# Patient Record
Sex: MALE | Race: WHITE | NOT HISPANIC OR LATINO | Employment: PART TIME | URBAN - METROPOLITAN AREA
[De-identification: names, ages, dates, MRNs, and addresses within clinical notes are randomized per-mention and may not be internally consistent; named-entity substitution may affect disease eponyms.]

---

## 2023-03-31 DIAGNOSIS — M10.9 GOUT, UNSPECIFIED CAUSE, UNSPECIFIED CHRONICITY, UNSPECIFIED SITE: Primary | ICD-10-CM

## 2023-04-03 RX ORDER — ALLOPURINOL 300 MG/1
TABLET ORAL
Qty: 90 TABLET | Refills: 0 | Status: SHIPPED | OUTPATIENT
Start: 2023-04-03 | End: 2023-07-03

## 2023-05-26 LAB
ALANINE AMINOTRANSFERASE (SGPT) (U/L) IN SER/PLAS: 35 U/L (ref 10–52)
ALBUMIN (G/DL) IN SER/PLAS: 4.3 G/DL (ref 3.4–5)
ALKALINE PHOSPHATASE (U/L) IN SER/PLAS: 56 U/L (ref 33–136)
ANION GAP IN SER/PLAS: 13 MMOL/L (ref 10–20)
ASPARTATE AMINOTRANSFERASE (SGOT) (U/L) IN SER/PLAS: 21 U/L (ref 9–39)
BASOPHILS (10*3/UL) IN BLOOD BY AUTOMATED COUNT: 0.05 X10E9/L (ref 0–0.1)
BASOPHILS/100 LEUKOCYTES IN BLOOD BY AUTOMATED COUNT: 0.5 % (ref 0–2)
BILIRUBIN TOTAL (MG/DL) IN SER/PLAS: 0.7 MG/DL (ref 0–1.2)
CALCIUM (MG/DL) IN SER/PLAS: 10 MG/DL (ref 8.6–10.3)
CARBON DIOXIDE, TOTAL (MMOL/L) IN SER/PLAS: 30 MMOL/L (ref 21–32)
CHLORIDE (MMOL/L) IN SER/PLAS: 104 MMOL/L (ref 98–107)
CHOLESTEROL (MG/DL) IN SER/PLAS: 137 MG/DL (ref 0–199)
CHOLESTEROL IN HDL (MG/DL) IN SER/PLAS: 39.1 MG/DL
CHOLESTEROL/HDL RATIO: 3.5
CREATININE (MG/DL) IN SER/PLAS: 1.1 MG/DL (ref 0.5–1.3)
EOSINOPHILS (10*3/UL) IN BLOOD BY AUTOMATED COUNT: 0.54 X10E9/L (ref 0–0.4)
EOSINOPHILS/100 LEUKOCYTES IN BLOOD BY AUTOMATED COUNT: 5.1 % (ref 0–6)
ERYTHROCYTE DISTRIBUTION WIDTH (RATIO) BY AUTOMATED COUNT: 13.1 % (ref 11.5–14.5)
ERYTHROCYTE MEAN CORPUSCULAR HEMOGLOBIN CONCENTRATION (G/DL) BY AUTOMATED: 32.1 G/DL (ref 32–36)
ERYTHROCYTE MEAN CORPUSCULAR VOLUME (FL) BY AUTOMATED COUNT: 98 FL (ref 80–100)
ERYTHROCYTES (10*6/UL) IN BLOOD BY AUTOMATED COUNT: 4.88 X10E12/L (ref 4.5–5.9)
ESTIMATED AVERAGE GLUCOSE FOR HBA1C: 114 MG/DL
GFR MALE: 68 ML/MIN/1.73M2
GLUCOSE (MG/DL) IN SER/PLAS: 93 MG/DL (ref 74–99)
HEMATOCRIT (%) IN BLOOD BY AUTOMATED COUNT: 47.7 % (ref 41–52)
HEMOGLOBIN (G/DL) IN BLOOD: 15.3 G/DL (ref 13.5–17.5)
HEMOGLOBIN A1C/HEMOGLOBIN TOTAL IN BLOOD: 5.6 %
IMMATURE GRANULOCYTES/100 LEUKOCYTES IN BLOOD BY AUTOMATED COUNT: 0.2 % (ref 0–0.9)
LDL: 71 MG/DL (ref 0–99)
LEUKOCYTES (10*3/UL) IN BLOOD BY AUTOMATED COUNT: 10.7 X10E9/L (ref 4.4–11.3)
LYMPHOCYTES (10*3/UL) IN BLOOD BY AUTOMATED COUNT: 3.65 X10E9/L (ref 0.8–3)
LYMPHOCYTES/100 LEUKOCYTES IN BLOOD BY AUTOMATED COUNT: 34.2 % (ref 13–44)
MONOCYTES (10*3/UL) IN BLOOD BY AUTOMATED COUNT: 0.74 X10E9/L (ref 0.05–0.8)
MONOCYTES/100 LEUKOCYTES IN BLOOD BY AUTOMATED COUNT: 6.9 % (ref 2–10)
NEUTROPHILS (10*3/UL) IN BLOOD BY AUTOMATED COUNT: 5.67 X10E9/L (ref 1.6–5.5)
NEUTROPHILS/100 LEUKOCYTES IN BLOOD BY AUTOMATED COUNT: 53.1 % (ref 40–80)
PLATELETS (10*3/UL) IN BLOOD AUTOMATED COUNT: 243 X10E9/L (ref 150–450)
POTASSIUM (MMOL/L) IN SER/PLAS: 4.8 MMOL/L (ref 3.5–5.3)
PROSTATE SPECIFIC ANTIGEN,SCREEN: 2.33 NG/ML (ref 0–4)
PROTEIN TOTAL: 7.1 G/DL (ref 6.4–8.2)
SODIUM (MMOL/L) IN SER/PLAS: 142 MMOL/L (ref 136–145)
THYROTROPIN (MIU/L) IN SER/PLAS BY DETECTION LIMIT <= 0.05 MIU/L: 1.95 MIU/L (ref 0.44–3.98)
TRIGLYCERIDE (MG/DL) IN SER/PLAS: 136 MG/DL (ref 0–149)
UREA NITROGEN (MG/DL) IN SER/PLAS: 21 MG/DL (ref 6–23)
VLDL: 27 MG/DL (ref 0–40)

## 2023-06-20 ENCOUNTER — OFFICE VISIT (OUTPATIENT)
Dept: PRIMARY CARE | Facility: CLINIC | Age: 78
End: 2023-06-20
Payer: MEDICARE

## 2023-06-20 VITALS
DIASTOLIC BLOOD PRESSURE: 64 MMHG | BODY MASS INDEX: 32.11 KG/M2 | OXYGEN SATURATION: 93 % | HEIGHT: 71 IN | WEIGHT: 229.4 LBS | RESPIRATION RATE: 18 BRPM | HEART RATE: 90 BPM | SYSTOLIC BLOOD PRESSURE: 120 MMHG

## 2023-06-20 DIAGNOSIS — G95.9 CERVICAL MYELOPATHY (MULTI): ICD-10-CM

## 2023-06-20 DIAGNOSIS — M54.16 LUMBAR RADICULOPATHY: ICD-10-CM

## 2023-06-20 DIAGNOSIS — I73.9 PVD (PERIPHERAL VASCULAR DISEASE) (CMS-HCC): ICD-10-CM

## 2023-06-20 DIAGNOSIS — I10 HYPERTENSION, UNSPECIFIED TYPE: Primary | ICD-10-CM

## 2023-06-20 DIAGNOSIS — E78.5 HYPERLIPIDEMIA, UNSPECIFIED HYPERLIPIDEMIA TYPE: ICD-10-CM

## 2023-06-20 PROBLEM — I47.10 SUPRAVENTRICULAR TACHYCARDIA (CMS-HCC): Status: ACTIVE | Noted: 2023-06-20

## 2023-06-20 PROBLEM — N52.9 ERECTILE DYSFUNCTION: Status: ACTIVE | Noted: 2023-06-20

## 2023-06-20 PROBLEM — K21.9 GERD (GASTROESOPHAGEAL REFLUX DISEASE): Status: ACTIVE | Noted: 2023-06-20

## 2023-06-20 PROBLEM — M10.9 GOUT: Status: ACTIVE | Noted: 2023-06-20

## 2023-06-20 PROBLEM — I47.10 SUPRAVENTRICULAR TACHYCARDIA (CMS-HCC): Status: RESOLVED | Noted: 2023-06-20 | Resolved: 2023-06-20

## 2023-06-20 PROBLEM — R41.3 MEMORY LOSS: Status: ACTIVE | Noted: 2023-06-20

## 2023-06-20 PROBLEM — G47.30 SLEEP APNEA: Status: ACTIVE | Noted: 2023-06-20

## 2023-06-20 PROBLEM — R26.81 UNSTABLE GAIT: Status: ACTIVE | Noted: 2023-06-20

## 2023-06-20 PROBLEM — R79.89 ELEVATED LFTS: Status: ACTIVE | Noted: 2023-06-20

## 2023-06-20 PROBLEM — M48.061 LUMBAR STENOSIS: Status: ACTIVE | Noted: 2023-06-20

## 2023-06-20 PROBLEM — R79.89 ELEVATED LFTS: Status: RESOLVED | Noted: 2023-06-20 | Resolved: 2023-06-20

## 2023-06-20 PROBLEM — L42 PITYRIASIS ROSEA: Status: ACTIVE | Noted: 2023-06-20

## 2023-06-20 PROCEDURE — 1160F RVW MEDS BY RX/DR IN RCRD: CPT | Performed by: STUDENT IN AN ORGANIZED HEALTH CARE EDUCATION/TRAINING PROGRAM

## 2023-06-20 PROCEDURE — 3074F SYST BP LT 130 MM HG: CPT | Performed by: STUDENT IN AN ORGANIZED HEALTH CARE EDUCATION/TRAINING PROGRAM

## 2023-06-20 PROCEDURE — 3078F DIAST BP <80 MM HG: CPT | Performed by: STUDENT IN AN ORGANIZED HEALTH CARE EDUCATION/TRAINING PROGRAM

## 2023-06-20 PROCEDURE — 99214 OFFICE O/P EST MOD 30 MIN: CPT | Performed by: STUDENT IN AN ORGANIZED HEALTH CARE EDUCATION/TRAINING PROGRAM

## 2023-06-20 PROCEDURE — 1159F MED LIST DOCD IN RCRD: CPT | Performed by: STUDENT IN AN ORGANIZED HEALTH CARE EDUCATION/TRAINING PROGRAM

## 2023-06-20 PROCEDURE — 1036F TOBACCO NON-USER: CPT | Performed by: STUDENT IN AN ORGANIZED HEALTH CARE EDUCATION/TRAINING PROGRAM

## 2023-06-20 RX ORDER — SILDENAFIL 50 MG/1
TABLET, FILM COATED ORAL
COMMUNITY

## 2023-06-20 RX ORDER — CEPHRADINE 500 MG
1 CAPSULE ORAL DAILY
COMMUNITY
Start: 2016-01-28 | End: 2023-12-07 | Stop reason: WASHOUT

## 2023-06-20 RX ORDER — LISINOPRIL 20 MG/1
20 TABLET ORAL DAILY
COMMUNITY
End: 2024-01-15

## 2023-06-20 RX ORDER — MELOXICAM 15 MG/1
15 TABLET ORAL DAILY PRN
COMMUNITY
End: 2023-11-09

## 2023-06-20 RX ORDER — FLUOCINOLONE ACETONIDE 0.11 MG/ML
OIL AURICULAR (OTIC)
COMMUNITY
Start: 2023-05-24 | End: 2023-12-07 | Stop reason: WASHOUT

## 2023-06-20 RX ORDER — ATORVASTATIN CALCIUM 40 MG/1
40 TABLET, FILM COATED ORAL DAILY
COMMUNITY

## 2023-06-20 RX ORDER — TRIAMCINOLONE ACETONIDE 1 MG/G
OINTMENT TOPICAL
COMMUNITY
Start: 2023-04-27

## 2023-06-20 RX ORDER — EPINEPHRINE 0.22MG
AEROSOL WITH ADAPTER (ML) INHALATION
COMMUNITY
Start: 2016-01-28

## 2023-06-20 ASSESSMENT — ENCOUNTER SYMPTOMS
LOSS OF SENSATION IN FEET: 1
DEPRESSION: 0
OCCASIONAL FEELINGS OF UNSTEADINESS: 0

## 2023-06-20 ASSESSMENT — PATIENT HEALTH QUESTIONNAIRE - PHQ9
2. FEELING DOWN, DEPRESSED OR HOPELESS: NOT AT ALL
SUM OF ALL RESPONSES TO PHQ9 QUESTIONS 1 AND 2: 0
1. LITTLE INTEREST OR PLEASURE IN DOING THINGS: NOT AT ALL

## 2023-06-20 NOTE — ASSESSMENT & PLAN NOTE
Doing very well  Continue lisinopril 20mg daily  Physical exam was stable. Discussed maintaining diets such as DASH to help maintain normal Blood pressure. Encouraged regular exercise for a total of 120 min weekly. We will fu labs in 1-3 days. For now there will be no change in medical management. All questions and concerns were addressed. Pt will follow up in 4-6 months or sooner if needed.

## 2023-06-20 NOTE — PROGRESS NOTES
Subjective   Patient ID: Cl Feliz is a 77 y.o. male who presents for Follow-up (Pt is here for a 6 month follow up.).    Sensation of the lower extremity is continuing to have a problem. He is noticing decrease in tolerance to walking. He has completed a stress test with cardiology.           Review of Systems    Objective   Physical Exam  Constitutional:       General: He is not in acute distress.     Appearance: He is not ill-appearing.   HENT:      Right Ear: Tympanic membrane and ear canal normal.      Left Ear: Tympanic membrane and ear canal normal.      Mouth/Throat:      Mouth: Mucous membranes are moist.      Pharynx: Oropharynx is clear. No oropharyngeal exudate or posterior oropharyngeal erythema.   Eyes:      Extraocular Movements: Extraocular movements intact.      Conjunctiva/sclera: Conjunctivae normal.      Pupils: Pupils are equal, round, and reactive to light.   Neck:      Vascular: No carotid bruit.   Cardiovascular:      Rate and Rhythm: Normal rate and regular rhythm.      Heart sounds: No murmur heard.     No gallop.   Pulmonary:      Effort: Pulmonary effort is normal.      Breath sounds: Normal breath sounds. No wheezing, rhonchi or rales.   Abdominal:      General: Abdomen is flat. Bowel sounds are normal.      Palpations: Abdomen is soft.      Tenderness: There is no abdominal tenderness.   Musculoskeletal:      Cervical back: Neck supple.      Left lower leg: No edema.   Skin:     General: Skin is warm and dry.      Findings: No rash.   Neurological:      General: No focal deficit present.      Mental Status: He is alert and oriented to person, place, and time.      Gait: Gait normal.   Psychiatric:         Mood and Affect: Mood normal.         Behavior: Behavior normal.         Assessment/Plan   Problem List Items Addressed This Visit       Lumbar stenosis    PVD (peripheral vascular disease) (CMS/[facility])     PvR completed with home visit   Within normal limited         Hyperlipidemia      Atorvastatin 40mg daily  Doing well         HTN (hypertension) - Primary     Doing very well  Continue lisinopril 20mg daily  Physical exam was stable. Discussed maintaining diets such as DASH to help maintain normal Blood pressure. Encouraged regular exercise for a total of 120 min weekly. We will fu labs in 1-3 days. For now there will be no change in medical management. All questions and concerns were addressed. Pt will follow up in 4-6 months or sooner if needed.             Other Visit Diagnoses       Lumbar radiculopathy        numbness in the lower extremity   will start with lumbar xray  PT next steps    Relevant Orders    XR lumbar spine complete 4+ views

## 2023-06-22 DIAGNOSIS — M54.16 LUMBAR RADICULOPATHY: Primary | ICD-10-CM

## 2023-07-01 DIAGNOSIS — M10.9 GOUT, UNSPECIFIED CAUSE, UNSPECIFIED CHRONICITY, UNSPECIFIED SITE: ICD-10-CM

## 2023-07-03 RX ORDER — ALLOPURINOL 300 MG/1
TABLET ORAL
Qty: 90 TABLET | Refills: 0 | Status: SHIPPED | OUTPATIENT
Start: 2023-07-03 | End: 2023-09-21

## 2023-09-21 DIAGNOSIS — M10.9 GOUT, UNSPECIFIED CAUSE, UNSPECIFIED CHRONICITY, UNSPECIFIED SITE: ICD-10-CM

## 2023-09-21 RX ORDER — ALLOPURINOL 300 MG/1
TABLET ORAL
Qty: 90 TABLET | Refills: 1 | Status: SHIPPED | OUTPATIENT
Start: 2023-09-21 | End: 2024-04-05 | Stop reason: SDUPTHER

## 2023-10-09 ENCOUNTER — APPOINTMENT (OUTPATIENT)
Dept: PHYSICAL THERAPY | Facility: CLINIC | Age: 78
End: 2023-10-09
Payer: MEDICARE

## 2023-10-10 PROBLEM — E79.0 HYPERURICEMIA WITHOUT SIGNS OF INFLAMMATORY ARTHRITIS AND TOPHACEOUS DISEASE: Status: ACTIVE | Noted: 2023-10-10

## 2023-10-10 PROBLEM — H60.549 ECZEMA OF EXTERNAL AUDITORY CANAL: Status: ACTIVE | Noted: 2021-05-13

## 2023-10-10 PROBLEM — M62.81 MUSCLE WEAKNESS: Status: ACTIVE | Noted: 2023-10-10

## 2023-10-10 PROBLEM — R55 NEAR SYNCOPE: Status: ACTIVE | Noted: 2023-10-10

## 2023-10-10 PROBLEM — M54.16 LUMBAR RADICULOPATHY: Status: ACTIVE | Noted: 2023-10-10

## 2023-10-10 PROBLEM — H61.23 BILATERAL IMPACTED CERUMEN: Status: ACTIVE | Noted: 2020-05-26

## 2023-10-10 PROBLEM — M1A.0510 CHRONIC GOUT OF RIGHT HIP: Status: ACTIVE | Noted: 2023-10-10

## 2023-10-10 PROBLEM — M48.02 CERVICAL SPINAL STENOSIS: Status: ACTIVE | Noted: 2023-10-10

## 2023-10-10 PROBLEM — H60.399 ACUTE INFECTIVE OTITIS EXTERNA: Status: ACTIVE | Noted: 2020-05-26

## 2023-10-10 PROBLEM — E66.811 CLASS 1 OBESITY WITH BODY MASS INDEX (BMI) OF 31.0 TO 31.9 IN ADULT: Status: ACTIVE | Noted: 2023-10-10

## 2023-10-10 PROBLEM — R26.9 GAIT DISTURBANCE: Status: ACTIVE | Noted: 2023-10-10

## 2023-10-10 PROBLEM — G47.33 OSA (OBSTRUCTIVE SLEEP APNEA): Status: ACTIVE | Noted: 2018-05-30

## 2023-10-10 PROBLEM — E66.9 CLASS 1 OBESITY WITH BODY MASS INDEX (BMI) OF 31.0 TO 31.9 IN ADULT: Status: ACTIVE | Noted: 2023-10-10

## 2023-10-10 PROBLEM — G95.9 CERVICAL MYELOPATHY (MULTI): Status: ACTIVE | Noted: 2023-10-10

## 2023-10-10 RX ORDER — LOVASTATIN 20 MG/1
1 TABLET ORAL NIGHTLY
COMMUNITY
Start: 2013-10-07 | End: 2023-12-07 | Stop reason: WASHOUT

## 2023-10-10 RX ORDER — SOTROVIMAB 62.5 MG/ML
INJECTION, SOLUTION, CONCENTRATE INTRAVENOUS
COMMUNITY
End: 2023-12-07 | Stop reason: WASHOUT

## 2023-10-10 RX ORDER — DEXAMETHASONE 6 MG/1
1 TABLET ORAL DAILY
COMMUNITY
End: 2023-12-07 | Stop reason: WASHOUT

## 2023-10-10 RX ORDER — LISINOPRIL 10 MG/1
10 TABLET ORAL DAILY
COMMUNITY
Start: 2013-10-07 | End: 2023-12-07 | Stop reason: WASHOUT

## 2023-10-10 RX ORDER — DORZOLAMIDE HYDROCHLORIDE AND TIMOLOL MALEATE 20; 5 MG/ML; MG/ML
1 SOLUTION/ DROPS OPHTHALMIC 2 TIMES DAILY
COMMUNITY
End: 2023-12-07 | Stop reason: WASHOUT

## 2023-10-10 RX ORDER — ALBUTEROL SULFATE 90 UG/1
1 AEROSOL, METERED RESPIRATORY (INHALATION) EVERY 4 HOURS PRN
COMMUNITY

## 2023-10-10 RX ORDER — ASPIRIN 81 MG/1
81 TABLET ORAL DAILY
COMMUNITY

## 2023-10-10 RX ORDER — SODIUM FLUORIDE 6 MG/ML
PASTE, DENTIFRICE DENTAL
COMMUNITY

## 2023-10-10 RX ORDER — OMEPRAZOLE 20 MG/1
20 CAPSULE, DELAYED RELEASE ORAL
COMMUNITY
Start: 2013-10-07

## 2023-10-10 RX ORDER — KETOCONAZOLE 20 MG/ML
SHAMPOO, SUSPENSION TOPICAL 3 TIMES WEEKLY
COMMUNITY
End: 2023-12-07 | Stop reason: WASHOUT

## 2023-10-10 RX ORDER — KETOCONAZOLE 20 MG/G
CREAM TOPICAL 2 TIMES DAILY
COMMUNITY
End: 2023-12-07 | Stop reason: WASHOUT

## 2023-10-10 RX ORDER — GABAPENTIN 100 MG/1
1 CAPSULE ORAL 3 TIMES DAILY
COMMUNITY
End: 2023-12-07 | Stop reason: WASHOUT

## 2023-10-10 RX ORDER — VIT C/E/ZN/COPPR/LUTEIN/ZEAXAN 250MG-90MG
2000 CAPSULE ORAL DAILY
COMMUNITY

## 2023-10-11 ENCOUNTER — EVALUATION (OUTPATIENT)
Dept: PHYSICAL THERAPY | Facility: CLINIC | Age: 78
End: 2023-10-11
Payer: MEDICARE

## 2023-10-11 DIAGNOSIS — M54.16 LUMBAR RADICULOPATHY: Primary | ICD-10-CM

## 2023-10-11 PROCEDURE — 97110 THERAPEUTIC EXERCISES: CPT | Mod: GP | Performed by: PHYSICAL MEDICINE & REHABILITATION

## 2023-10-11 PROCEDURE — 97161 PT EVAL LOW COMPLEX 20 MIN: CPT | Mod: GP | Performed by: PHYSICAL MEDICINE & REHABILITATION

## 2023-10-11 ASSESSMENT — PAIN SCALES - GENERAL: PAINLEVEL_OUTOF10: 5 - MODERATE PAIN

## 2023-10-11 ASSESSMENT — ENCOUNTER SYMPTOMS
OCCASIONAL FEELINGS OF UNSTEADINESS: 1
LOSS OF SENSATION IN FEET: 1
DEPRESSION: 0

## 2023-10-11 NOTE — Clinical Note
October 11, 2023     Patient: Cl Feliz   YOB: 1945   Date of Visit: 10/11/2023       To Whom it May Concern:    Cl Feliz was seen in my clinic on 10/11/2023. He {Return to school/sport:28925}.    If you have any questions or concerns, please don't hesitate to call.         Sincerely,          Massimo Curran, PT        CC: No Recipients

## 2023-10-11 NOTE — PROGRESS NOTES
Physical Therapy  Physical Therapy Orthopedic Evaluation    Patient Name: Cl Feliz  MRN: [id number]  Today's Date: 10/11/2023  Time Calculation  Start Time: 1000  Stop Time: 1045  Time Calculation (min): 45 min    Insurance:  Visit number: 1   Authorization info: Auth Required  Insurance Type: [employer] MCR    Current Problem  1. Lumbar radiculopathy  Follow Up In Physical Therapy    Follow Up In Physical Therapy          General:  General  Reason for Referral: Lumbar Radiculopathy  Referred By: Dr. Gutiérrez  General Comment: Patient presents with low back pain, as well as radicular symptoms down to bilateral feet. Patient describes these symptoms as primarily numbness. Patient notes that these symptoms will worsens towards the end of the day. Patient does not recall any other type of activity that will aggravate these symptoms. Patient notes that these symptoms have persisted on and off for the past few years. Patient reports his greatest difficulty is walking around at night.      Precautions:   Precautions  STEADI Fall Risk Score (The score of 4 or more indicates an increased risk of falling): 6  Precautions Comment: Hx of vaso vagal syncopy; increased fall risk    Medical History Form: Reviewed (scanned into chart)    Subjective:   Subjective     Pain:  Pain Score: 5 - Moderate pain (Numbness)  Pain Location: Foot (Bilateral)    Prior Level of Function (PLOF)  Patient previously independent with all ADLs    Patients Living Environment: Reviewed and no concern    Primary Language: English    Red Flags: Do you have any of the following? Partially  Fever/chills, unexplained weight changes, dizziness/fainting, unexplained change in bowel or bladder functions, unexplained malaise or muscle weakness, night pain/sweats, numbness or tingling    Objective:  Objective       ROM    Lumbar AROM (%)    Flexion: Fingertips to ankles  Extension: 25%      Strength Testing    Hip    (R)  (L)  Flexion: 4/5  4/5       Knee                           (R)                     (L)  Extension         4/5                    4/5       Flexion             4/5                    4/5       Ankle                          (R)                     (L)  Dorsiflexion       4/5                    4/5       Plantarflexion    4/5                    4/5       Posture: Forward flexed posture, diminished curvature of lumbar spine    Gait: Waddling gait    Nazanin Lumbar Assessment:  MAYO: Reduced numbness in bilateral feet from 4-5/10 to 3-4/10      Outcome Measures:  Other Measures  5x Sit to Stand: 10 seconds  30 Second Sit to Stand: 15 repetitions  Oswestry Disablity Index (RACHEL): 16%            Assessment: Patient presents with signs and symptoms consistent with lumbar radiculopathy, resulting in limited participation in pain-free ADLs and inability to perform at their prior level of function. Pt would benefit from physical therapy to address the impairments found & listed previously in the objective section in order to return to safe and pain-free ADLs and prior level of function.       Plan:  PT Plan: Skilled PT  PT Frequency: 1 time per week  Duration: 6 weeks  Onset Date: 10/11/20  Certification Period Start Date: 10/11/23  Certification Period End Date: 01/09/24  Rehab Potential: Fair  Plan of Care Agreement: Patient  Planned Interventions include: therapeutic exercise, self-care home management, manual therapy, therapeutic activities, gait training, neuromuscular coordination, vasopneumatic, dry needling, aquatic therapy  Frequency: 1 x Week  Duration: 6 Weeks    Treatment Performed:  Therapeutic Exercise  Therapeutic Exercise Activity 1: Standing Lumbar Extension against edge of table: 3x10  EDUCATION: Discussed purpose of PT, expectations, goals, POC and HEP    Goals: Set and discussed today  Encounter Problems       Encounter Problems (Active)       PT Problem       PT Goal 1       Start:  10/11/23            PT Goal 2       Start:  10/11/23            PT  Goal 3       Start:  10/11/23            PT Goal 4       Start:  10/11/23               Plan of care was developed with input and agreement by the patient  Massimo Curran, PT

## 2023-10-11 NOTE — Clinical Note
October 11, 2023     Patient: Cl Feliz   YOB: 1945   Date of Visit: 10/11/2023       To Whom It May Concern:    It is my medical opinion that Cl Feliz {Work release (duty restriction):71759}.    If you have any questions or concerns, please don't hesitate to call.         Sincerely,        Massimo Curran, PT    CC: No Recipients

## 2023-10-17 ENCOUNTER — APPOINTMENT (OUTPATIENT)
Dept: PHYSICAL THERAPY | Facility: CLINIC | Age: 78
End: 2023-10-17
Payer: MEDICARE

## 2023-10-24 ENCOUNTER — TREATMENT (OUTPATIENT)
Dept: PHYSICAL THERAPY | Facility: CLINIC | Age: 78
End: 2023-10-24
Payer: MEDICARE

## 2023-10-24 DIAGNOSIS — M54.16 LUMBAR RADICULOPATHY: ICD-10-CM

## 2023-10-24 PROCEDURE — 97112 NEUROMUSCULAR REEDUCATION: CPT | Mod: GP | Performed by: PHYSICAL MEDICINE & REHABILITATION

## 2023-10-24 PROCEDURE — 97110 THERAPEUTIC EXERCISES: CPT | Mod: GP | Performed by: PHYSICAL MEDICINE & REHABILITATION

## 2023-10-24 ASSESSMENT — PAIN SCALES - GENERAL: PAINLEVEL_OUTOF10: 3

## 2023-10-24 NOTE — PROGRESS NOTES
"  Physical Therapy Treatment    Patient Name: Cl Feliz  MRN: [id number]  Today's Date: 10/24/2023  Time Calculation  Start Time: 0800  Stop Time: 0840  Time Calculation (min): 40 min  Current Problem  1. Lumbar radiculopathy  Follow Up In Physical Therapy          Insurance:     Certification Period Start Date: 10/11/23  Certification Period End Date: 01/09/24    Subjective   General  Reason for Referral: Lumbar Radiculopathy  Referred By: Dr. Gutiérrez  General Comment: Patient reports improvement in symptoms since his previous visit. He is uncertain as to whether to attribute this improvement to performing his HEP, or participating in exercise at the gym.      Performing HEP?: Yes    Precautions  Precautions  STEADI Fall Risk Score (The score of 4 or more indicates an increased risk of falling): 6  Precautions Comment: Hx of vaso vagal syncopy; increased fall risk  Pain  Pain Score: 3    Objective   Treatments:    Therapeutic Exercise  Therapeutic Exercise Activity 1: SciFit: L3 x5'  Therapeutic Exercise Activity 2: Standing Lumbar Extension against edge of table: 3x10  Therapeutic Exercise Activity 3: Piriformis stretch: 2x30\" each side  Therapeutic Exercise Activity 4: Lumbar rotations: x20 w legs on red ball  Therapeutic Exercise Activity 5: DKTC: x20 w legs on red ball  Therapeutic Exercise Activity 6: Swiss Ball Rollout: fwd and diagonally x10 each    Balance/Neuromuscular Re-Education  Balance/Neuromuscular Re-Education Activity 1: Tandem Stance: 2x30\" each side         Therapeutic Activity  Therapeutic Activity 1: Forward Step Up: 2x10 each leg on 6\" step  Therapeutic Activity 2: Lateral Step  Up: 2x10 each leg on 6\" step       Assessment:   Today's visit focused on progressing activities to improve patient's lumbar/hip mobility, lower extremity balance and functional ability. Patient demonstrated good effort toward today's progressions. No increased pain reported at the conclusion of the session. Overall " response toward today's interventions was great.      Plan:  Continue with current POC. Progress stability and functional ability as tolerated.   OP PT Plan  PT Frequency: 1 time per week  Duration: 6 weeks  Onset Date: 10/11/20  Certification Period Start Date: 10/11/23  Certification Period End Date: 01/09/24  Rehab Potential: Fair  Plan of Care Agreement: Patient    Goals:  Active       PT Problem       Patient to be able to walk 1/2 mile or more without any increase of symptoms in order to improve overall quality of life.       Start:  10/11/23    Expected End:  11/24/23            Patient to be able to complete house work and ADLs with pain levels no greater than 2/10 in order to improve overall quality of life.       Start:  10/11/23    Expected End:  11/24/23            Patient to score 10% or greater on Oswestry Low Back Disability Index in order to improve overall quality of life.       Start:  10/11/23    Expected End:  11/24/23            Patient to demonstrate independence with HEP in order to establish self management of symptoms.       Start:  10/11/23    Expected End:  11/24/23                 Massimo Curran, PT

## 2023-10-31 ENCOUNTER — TREATMENT (OUTPATIENT)
Dept: PHYSICAL THERAPY | Facility: CLINIC | Age: 78
End: 2023-10-31
Payer: MEDICARE

## 2023-10-31 DIAGNOSIS — M54.16 LUMBAR RADICULOPATHY: ICD-10-CM

## 2023-10-31 PROCEDURE — 97530 THERAPEUTIC ACTIVITIES: CPT | Mod: GP | Performed by: PHYSICAL MEDICINE & REHABILITATION

## 2023-10-31 PROCEDURE — 97110 THERAPEUTIC EXERCISES: CPT | Mod: GP | Performed by: PHYSICAL MEDICINE & REHABILITATION

## 2023-10-31 NOTE — PROGRESS NOTES
"  Physical Therapy Treatment    Patient Name: Cl Feliz  MRN: [id number]  Today's Date: 10/31/2023  Time Calculation  Start Time: 1147  Stop Time: 1225  Time Calculation (min): 38 min  Current Problem  1. Lumbar radiculopathy  Follow Up In Physical Therapy          Insurance:  Number of Treatments Authorized: 3 of 10  Certification Period Start Date: 10/11/23  Certification Period End Date: 01/09/24    Subjective   General  Reason for Referral: Lumbar Radiculopathy  Referred By: Dr. Gutiérrez  General Comment: Patient continues to report improvement in symptoms this visit. He has been adherent to his HEP and continues to exercise in the gym. No new issues since his last visit.      Performing HEP?: Yes    Precautions  Precautions  STEADI Fall Risk Score (The score of 4 or more indicates an increased risk of falling): 6  Precautions Comment: Hx of vaso vagal syncopy; increased fall risk    Objective     Treatments:    Therapeutic Exercise  Therapeutic Exercise Activity 1: SciFit: L3 x5'  Therapeutic Exercise Activity 2: Standing Lumbar Extension against edge of table: 3x10  Therapeutic Exercise Activity 3: Piriformis stretch: 2x30\" each side  Therapeutic Exercise Activity 4: Lumbar rotations: x20 w legs on red ball  Therapeutic Exercise Activity 5: DKTC: x20 w legs on red ball  Therapeutic Exercise Activity 6: Swiss Ball Rollout: fwd and diagonally x15 each    Balance/Neuromuscular Re-Education  Balance/Neuromuscular Re-Education Activity 1: Tandem Stance: 2x30\" each side  Balance/Neuromuscular Re-Education Activity 2: Side stepping on airex: x5 down and back    Therapeutic Activity  Therapeutic Activity 1: Forward Step Up: 2x10 each leg on 6\" step  Therapeutic Activity 2: Lateral Step Up: 2x10 each leg on 6\" step  Therapeutic Activity 3: Sit to stand: 3x5 from elevated table    Assessment:   Today's visit focused on progressing activities to improve patient's lower extremity strength, balance and functional ability. " Patient demonstrated good effort toward today's progressions. No increased pain reported at the conclusion of the session. Overall response toward today's interventions was great.      Plan:  Continue with current POC. Progress strength and functional ability as tolerated.  OP PT Plan  PT Frequency: 1 time per week  Duration: 6 weeks  Onset Date: 10/11/20  Certification Period Start Date: 10/11/23  Certification Period End Date: 01/09/24  Number of Treatments Authorized: 3 of 10  Rehab Potential: Fair  Plan of Care Agreement: Patient    Goals:  Active       PT Problem       Patient to be able to walk 1/2 mile or more without any increase of symptoms in order to improve overall quality of life.       Start:  10/11/23    Expected End:  11/24/23            Patient to be able to complete house work and ADLs with pain levels no greater than 2/10 in order to improve overall quality of life.       Start:  10/11/23    Expected End:  11/24/23            Patient to score 10% or greater on Oswestry Low Back Disability Index in order to improve overall quality of life.       Start:  10/11/23    Expected End:  11/24/23            Patient to demonstrate independence with HEP in order to establish self management of symptoms.       Start:  10/11/23    Expected End:  11/24/23                 Massimo Curran, PT

## 2023-11-07 ENCOUNTER — TREATMENT (OUTPATIENT)
Dept: PHYSICAL THERAPY | Facility: CLINIC | Age: 78
End: 2023-11-07
Payer: MEDICARE

## 2023-11-07 DIAGNOSIS — M54.16 LUMBAR RADICULOPATHY: ICD-10-CM

## 2023-11-07 PROCEDURE — 97110 THERAPEUTIC EXERCISES: CPT | Mod: GP | Performed by: PHYSICAL MEDICINE & REHABILITATION

## 2023-11-07 PROCEDURE — 97530 THERAPEUTIC ACTIVITIES: CPT | Mod: GP | Performed by: PHYSICAL MEDICINE & REHABILITATION

## 2023-11-07 PROCEDURE — 97112 NEUROMUSCULAR REEDUCATION: CPT | Mod: GP | Performed by: PHYSICAL MEDICINE & REHABILITATION

## 2023-11-07 ASSESSMENT — PAIN - FUNCTIONAL ASSESSMENT: PAIN_FUNCTIONAL_ASSESSMENT: 0-10

## 2023-11-07 ASSESSMENT — PAIN SCALES - GENERAL: PAINLEVEL_OUTOF10: 0 - NO PAIN

## 2023-11-07 NOTE — PROGRESS NOTES
"  Physical Therapy Treatment    Patient Name: Cl Feliz  MRN: [id number]  Today's Date: 11/7/2023  Time Calculation  Start Time: 0846  Stop Time: 0926  Time Calculation (min): 40 min  Current Problem  1. Lumbar radiculopathy  Follow Up In Physical Therapy          Insurance:  Number of Treatments Authorized: 4 of 10  Certification Period Start Date: 10/11/23  Certification Period End Date: 01/09/24    Subjective   General  Reason for Referral: Lumbar Radiculopathy  Referred By: Dr. Gutiérrez  General Comment: Patient reports to be doing well this visit. However, he did experience a set back recently in which he felt a spasm-like pain in his lower left leg. This sensation reached an intensity that felt like his leg may give out on him while walking. Since then, these symptoms have resolved.    Performing HEP?: Yes    Precautions  Precautions  STEADI Fall Risk Score (The score of 4 or more indicates an increased risk of falling): 6  Precautions Comment: Hx of vaso vagal syncopy; increased fall risk    Objective   Treatments:    Therapeutic Exercise  Therapeutic Exercise Activity 1: SciFit: L3 x5'  Therapeutic Exercise Activity 2: Standing Lumbar Extension against edge of table: 3x10  Therapeutic Exercise Activity 3: Piriformis stretch: 2x30\" each side  Therapeutic Exercise Activity 4: Lumbar rotations: x20 w legs on red ball  Therapeutic Exercise Activity 5: DKTC: x20 w legs on red ball  Therapeutic Exercise Activity 6: Swiss Ball Rollout: fwd and diagonally x15 each    Balance/Neuromuscular Re-Education  Balance/Neuromuscular Re-Education Activity 1: Tandem Stance: 2x30\" each side  Balance/Neuromuscular Re-Education Activity 2: Side stepping on airex: x5 down and back  Balance/Neuromuscular Re-Education Activity 3: Balance Board: fwd/back and side/side x20 taps each      Therapeutic Activity  Therapeutic Activity 1: Sit to stand: 2x10 from elevated table  Therapeutic Activity 2: Forward Step Up: 2x10 each leg on 6\" " "step  Therapeutic Activity 3: Lateral Step Up: 2x10 each leg on 6\" step    Assessment:   Today's visit focused on progressing activities to improve patient's bilateral lower extremity strength, balance and functional ability. Patient demonstrated good effort toward today's progressions. No increased pain reported at the conclusion of the session. Overall response toward today's interventions was great.      Plan:  Continue with current POC. Progress strength and balance as tolerated.  OP PT Plan  PT Frequency: 1 time per week  Duration: 6 weeks  Onset Date: 10/11/20  Certification Period Start Date: 10/11/23  Certification Period End Date: 01/09/24  Number of Treatments Authorized: 4 of 10  Rehab Potential: Fair  Plan of Care Agreement: Patient    Goals:  Active       PT Problem       Patient to be able to walk 1/2 mile or more without any increase of symptoms in order to improve overall quality of life.       Start:  10/11/23    Expected End:  11/24/23            Patient to be able to complete house work and ADLs with pain levels no greater than 2/10 in order to improve overall quality of life.       Start:  10/11/23    Expected End:  11/24/23            Patient to score 10% or greater on Oswestry Low Back Disability Index in order to improve overall quality of life.       Start:  10/11/23    Expected End:  11/24/23            Patient to demonstrate independence with HEP in order to establish self management of symptoms.       Start:  10/11/23    Expected End:  11/24/23                 Massimo Curran, PT  "

## 2023-11-09 DIAGNOSIS — M1A.0510: Primary | ICD-10-CM

## 2023-11-09 RX ORDER — MELOXICAM 15 MG/1
15 TABLET ORAL DAILY PRN
Qty: 90 TABLET | Refills: 0 | Status: SHIPPED | OUTPATIENT
Start: 2023-11-09 | End: 2024-02-12

## 2023-11-14 ENCOUNTER — TREATMENT (OUTPATIENT)
Dept: PHYSICAL THERAPY | Facility: CLINIC | Age: 78
End: 2023-11-14
Payer: MEDICARE

## 2023-11-14 DIAGNOSIS — M54.16 LUMBAR RADICULOPATHY: ICD-10-CM

## 2023-11-14 PROCEDURE — 97112 NEUROMUSCULAR REEDUCATION: CPT | Mod: GP | Performed by: PHYSICAL MEDICINE & REHABILITATION

## 2023-11-14 PROCEDURE — 97110 THERAPEUTIC EXERCISES: CPT | Mod: GP | Performed by: PHYSICAL MEDICINE & REHABILITATION

## 2023-11-14 ASSESSMENT — PAIN - FUNCTIONAL ASSESSMENT: PAIN_FUNCTIONAL_ASSESSMENT: 0-10

## 2023-11-14 ASSESSMENT — PAIN SCALES - GENERAL: PAINLEVEL_OUTOF10: 0 - NO PAIN

## 2023-11-14 NOTE — PROGRESS NOTES
"  Physical Therapy Treatment    Patient Name: Cl Feliz  MRN: [id number]  Today's Date: 11/14/2023  Time Calculation  Start Time: 0845  Stop Time: 0930  Time Calculation (min): 45 min  Current Problem  1. Lumbar radiculopathy  Follow Up In Physical Therapy          Insurance:  Number of Treatments Authorized: 5 of 10  Certification Period Start Date: 10/11/23  Certification Period End Date: 01/09/24    Subjective   General  Reason for Referral: Lumbar Radiculopathy  Referred By: Dr. Gutiérrez  General Comment: Patient reports feeling great progress this visit. He notes that he is beginning to get more sensation at the bottom of his feet. He feels ready to begin working on an HEP independently after today's visit.    Performing HEP?: Yes    Precautions  Precautions  STEADI Fall Risk Score (The score of 4 or more indicates an increased risk of falling): 6  Precautions Comment: Hx of vaso vagal syncopy; increased fall risk  Pain  Pain Assessment: 0-10  Pain Score: 0 - No pain    Objective     Outcome Measures:  Other Measures  Oswestry Disablity Index (RACHEL): 8%    Treatments:    Therapeutic Exercise  Therapeutic Exercise Activity 1: SciFit: L3 x5'  Therapeutic Exercise Activity 2: Standing Lumbar Extension against edge of table: 3x10  Therapeutic Exercise Activity 3: Piriformis stretch: 2x30\" each side  Therapeutic Exercise Activity 4: Lumbar rotations: x20 w legs on red ball  Therapeutic Exercise Activity 5: DKTC: x20 w legs on red ball  Therapeutic Exercise Activity 6: Swiss Ball Rollout: fwd and diagonally x15 each    Balance/Neuromuscular Re-Education  Balance/Neuromuscular Re-Education Activity 1: Tandem Stance: 2x30\" each side  Balance/Neuromuscular Re-Education Activity 2: Side stepping on airex: x5 down and back  Balance/Neuromuscular Re-Education Activity 3: Balance Board: fwd/back and side/side x20 taps each         Therapeutic Activity  Therapeutic Activity 1: Sit to stand: 2x10 from elevated table w 4# med " "ball  Therapeutic Activity 2: Forward Step Up: 2x10 each leg on 6\" step  Therapeutic Activity 3: Lateral Step Up: 2x10 each leg on 6\" step      Assessment:   Today's visit focused on progressing activities to improve patient's lower extremity strength, balance and functional ability. Patient demonstrated good effort toward today's progressions. No increased pain reported at the conclusion of the session. Overall response toward today's interventions was great. Patient has achieve nearly all of his goals set prior to initiating physical therapy. He feels as if he has reached the point in which he knows what to do to manage his symptoms.     Plan:  Discharge with HEP  OP PT Plan  PT Frequency: 1 time per week  Duration: 6 weeks  Onset Date: 10/11/20  Certification Period Start Date: 10/11/23  Certification Period End Date: 01/09/24  Number of Treatments Authorized: 5 of 10    Goals:  Resolved       PT Problem       Patient to be able to walk 1/2 mile or more without any increase of symptoms in order to improve overall quality of life. (Met)       Start:  10/11/23    Expected End:  11/24/23    Resolved:  11/14/23         Patient to be able to complete house work and ADLs with pain levels no greater than 2/10 in order to improve overall quality of life. (Met)       Start:  10/11/23    Expected End:  11/24/23    Resolved:  11/14/23         Patient to score 10% or greater on Oswestry Low Back Disability Index in order to improve overall quality of life. (Met)       Start:  10/11/23    Expected End:  11/24/23    Resolved:  11/14/23         Patient to demonstrate independence with HEP in order to establish self management of symptoms. (Met)       Start:  10/11/23    Expected End:  11/24/23    Resolved:  11/14/23              Massimo Curran, PT  "

## 2023-11-21 ENCOUNTER — OFFICE VISIT (OUTPATIENT)
Dept: OTOLARYNGOLOGY | Facility: CLINIC | Age: 78
End: 2023-11-21
Payer: MEDICARE

## 2023-11-21 VITALS — WEIGHT: 229 LBS | BODY MASS INDEX: 32.06 KG/M2 | HEIGHT: 71 IN

## 2023-11-21 DIAGNOSIS — H61.23 BILATERAL IMPACTED CERUMEN: ICD-10-CM

## 2023-11-21 DIAGNOSIS — H60.543 ECZEMA OF EXTERNAL EAR, BILATERAL: Primary | ICD-10-CM

## 2023-11-21 PROCEDURE — 1160F RVW MEDS BY RX/DR IN RCRD: CPT | Performed by: OTOLARYNGOLOGY

## 2023-11-21 PROCEDURE — 69210 REMOVE IMPACTED EAR WAX UNI: CPT | Performed by: OTOLARYNGOLOGY

## 2023-11-21 PROCEDURE — 99213 OFFICE O/P EST LOW 20 MIN: CPT | Performed by: OTOLARYNGOLOGY

## 2023-11-21 PROCEDURE — 1159F MED LIST DOCD IN RCRD: CPT | Performed by: OTOLARYNGOLOGY

## 2023-11-21 PROCEDURE — 1126F AMNT PAIN NOTED NONE PRSNT: CPT | Performed by: OTOLARYNGOLOGY

## 2023-11-21 PROCEDURE — 1036F TOBACCO NON-USER: CPT | Performed by: OTOLARYNGOLOGY

## 2023-11-21 RX ORDER — FLUOCINOLONE ACETONIDE 0.11 MG/ML
OIL AURICULAR (OTIC)
Qty: 20 ML | Refills: 3 | Status: SHIPPED | OUTPATIENT
Start: 2023-11-21

## 2023-11-21 NOTE — PROGRESS NOTES
Subjective   Patient ID: Cl Feliz is a 77 y.o. 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  He is complaining of congestion in his ears again.  Review of Systems    Objective   Physical Exam  There is cerumen impaction bilaterally and this was cleared using speculum and curettes.  There is dry squamous debris in the ear canals consistent with eczema and this was also debrided.  The tympanic membranes are clear and mobile.    Ear cerumen removal    Date/Time: 11/21/2023 11:45 AM    Performed by: Natalie Page MD  Authorized by: Natalie Page MD    Consent:     Consent obtained:  Verbal  Procedure details:     Location:  L ear and R ear    Procedure type: curette        Assessment/Plan   Diagnoses and all orders for this visit:  Eczema of external ear, bilateral (Primary)  -     fluocinolone (DermOtic) 0.01 % ear drops; Instill 5 drops in the affected ear once a day as needed for itching  Bilateral impacted cerumen  -     Ear cerumen removal     Eczema of the external ear canals and recurrent cerumen impaction which was cleaned today. He was advised to continue the Dermotic drops as needed and he will follow-up in 6 months. His prescription was renewed today.

## 2023-12-07 ENCOUNTER — OFFICE VISIT (OUTPATIENT)
Dept: CARDIOLOGY | Facility: CLINIC | Age: 78
End: 2023-12-07
Payer: MEDICARE

## 2023-12-07 VITALS
HEART RATE: 74 BPM | WEIGHT: 226 LBS | RESPIRATION RATE: 16 BRPM | DIASTOLIC BLOOD PRESSURE: 73 MMHG | HEIGHT: 72 IN | SYSTOLIC BLOOD PRESSURE: 127 MMHG | OXYGEN SATURATION: 96 % | BODY MASS INDEX: 30.61 KG/M2

## 2023-12-07 DIAGNOSIS — E78.2 MIXED HYPERLIPIDEMIA: ICD-10-CM

## 2023-12-07 DIAGNOSIS — I10 PRIMARY HYPERTENSION: ICD-10-CM

## 2023-12-07 DIAGNOSIS — R55 SYNCOPE, UNSPECIFIED SYNCOPE TYPE: ICD-10-CM

## 2023-12-07 PROCEDURE — 1126F AMNT PAIN NOTED NONE PRSNT: CPT | Performed by: INTERNAL MEDICINE

## 2023-12-07 PROCEDURE — 3078F DIAST BP <80 MM HG: CPT | Performed by: INTERNAL MEDICINE

## 2023-12-07 PROCEDURE — 3074F SYST BP LT 130 MM HG: CPT | Performed by: INTERNAL MEDICINE

## 2023-12-07 PROCEDURE — 1036F TOBACCO NON-USER: CPT | Performed by: INTERNAL MEDICINE

## 2023-12-07 PROCEDURE — 1159F MED LIST DOCD IN RCRD: CPT | Performed by: INTERNAL MEDICINE

## 2023-12-07 PROCEDURE — 99214 OFFICE O/P EST MOD 30 MIN: CPT | Performed by: INTERNAL MEDICINE

## 2023-12-07 ASSESSMENT — ENCOUNTER SYMPTOMS: DEPRESSION: 0

## 2023-12-07 NOTE — PROGRESS NOTES
Subjective   Yaya Feliz is a 78 y.o. male.    Chief Complaint:  YAYA FELIZ is being seen for an annual follow-up of dyslipidemia, hypertension,  syncope and a routine medication evaluation.   HPI  Mr. Feliz is a 77 y/o male who is here today for an annual Cardiology follow up visit. He denies chest pain, sob, heart palpitations, or pedal edema. He had a syncopal episode in March- see ER note. He wore a Zio patch monitor, reviewed results- see reports. Reviewed lab work results and current medications.     ROS    Objective   Physical Exam  This is a 78 year old male here today for a Cardiology follow up visit.  Cardiovascular Rate and Rhythm regular with S1 and S2. No murmur click or rub.   Pulmonary lungs bilaterally clear to auscultation posterior.   Neck supple no JVP no bruit good carotid upstroke.   Abdomen soft nontender bowel sounds present in all four quadrants.   Extremities no pedal edema pedal pulse present bilaterally.    Lab Review:   No visits with results within 6 Month(s) from this visit.   Latest known visit with results is:   Orders Only on 05/26/2023   Component Date Value    Hemoglobin A1C 05/26/2023 5.6     Estimated Average Glucose 05/26/2023 114     TSH 05/26/2023 1.95     Cholesterol 05/26/2023 137     HDL 05/26/2023 39.1 (A)     Cholesterol/HDL Ratio 05/26/2023 3.5     LDL 05/26/2023 71     VLDL 05/26/2023 27     Triglycerides 05/26/2023 136     WBC 05/26/2023 10.7     RBC 05/26/2023 4.88     Hemoglobin 05/26/2023 15.3     Hematocrit 05/26/2023 47.7     MCV 05/26/2023 98     MCHC 05/26/2023 32.1     Platelets 05/26/2023 243     RDW 05/26/2023 13.1     Neutrophils % 05/26/2023 53.1     Immature Granulocytes %,* 05/26/2023 0.2     Lymphocytes % 05/26/2023 34.2     Monocytes % 05/26/2023 6.9     Eosinophils % 05/26/2023 5.1     Basophils % 05/26/2023 0.5     Neutrophils Absolute 05/26/2023 5.67 (H)     Lymphocytes Absolute 05/26/2023 3.65 (H)     Monocytes Absolute 05/26/2023 0.74     Eosinophils  Absolute 05/26/2023 0.54 (H)     Basophils Absolute 05/26/2023 0.05     Glucose 05/26/2023 93     Sodium 05/26/2023 142     Potassium 05/26/2023 4.8     Chloride 05/26/2023 104     Bicarbonate 05/26/2023 30     Anion Gap 05/26/2023 13     Urea Nitrogen 05/26/2023 21     Creatinine 05/26/2023 1.10     GFR MALE 05/26/2023 68     Calcium 05/26/2023 10.0     Albumin 05/26/2023 4.3     Alkaline Phosphatase 05/26/2023 56     Total Protein 05/26/2023 7.1     AST 05/26/2023 21     Total Bilirubin 05/26/2023 0.7     ALT (SGPT) 05/26/2023 35     Prostate Specific Antige* 05/26/2023 2.33        Assessment/Plan   There were no encounter diagnoses.  1. Chest pain. Patient presented to [facility] July 7, 2017 with complaints of chest pain. He states he has had two cardiac catheters in the past, the last one about 15 years prior which he was told were normal. At that time he underwent a stress echo which was negative for ischemia. He also had an echocardiogram which showed mild LVH, EF of 65-69%, and mild aortic valve sclerosis.      2. Hypertension.  Blood pressure appears to be well within normal range.  Will continue on lisinopril 20 mg daily.     3. Hyperlipidemia. Fasting lipid panel 12/2021 showed a cholesterol 131, HDL 42, LDL 70, triglyceride 95.  The patient's most recent labs from 5/26/2023 include cholesterol 137 LDL 71 HDL 39 triglyceride 130.  The CBC and comprehensive metabolic panels were normal.  Glycohemoglobin 5.6% TSH 1.95 PSA 2.33.  Patient will remain on atorvastatin 40 mg daily.  Recheck labs after next visit.     4. Obstructive sleep apnea.  Patient has had a longstanding history of confirmed obstructive sleep apnea.  He falls asleep at times inappropriately In Charge watching television.  He has good and bad nights for sleep.  He has had a CPAP mask for many years but it has not been evaluated or changed in a number of years.  He will be referred to the sleep medicine nurse practitioner for  reevaluation.     5. Neck and spine surgery. After being ruled out for cardiac etiology with [facility] visit July 2017, patient saw neurology, Dr Manuel. He had neck surgery 02/2018 with Dr Jaramillo and apparently spine surgery may follow as he is still having low back pain.      6. Adenoma left adrenal. Patient had a CT of the chest and 7/2017 which showed a small, minute adenoma of the left adrenal.     7. Carotid US done 09/2017 showed less than 50% stenosis bilaterally.     8. PVRs done 09/2017 were negative.     9. Hx of vagal response. Wore Zio patch monitor 03/2022 that showed heart rate of 29-1 39 with an average of 84 bpm. Sinus rhythm. 2 SVT runs. Second-degree AV block Mobitz 1 was rarely present. Rare ectopy was noted. Patient triggered response for ectopy. Patient wears life alert necklace. Will notify office for any further presyncopal or syncopal events.

## 2023-12-19 ENCOUNTER — LAB (OUTPATIENT)
Dept: LAB | Facility: LAB | Age: 78
End: 2023-12-19
Payer: MEDICARE

## 2023-12-19 ENCOUNTER — OFFICE VISIT (OUTPATIENT)
Dept: PRIMARY CARE | Facility: CLINIC | Age: 78
End: 2023-12-19
Payer: MEDICARE

## 2023-12-19 VITALS
HEIGHT: 71 IN | DIASTOLIC BLOOD PRESSURE: 50 MMHG | WEIGHT: 226.4 LBS | HEART RATE: 99 BPM | SYSTOLIC BLOOD PRESSURE: 114 MMHG | BODY MASS INDEX: 31.69 KG/M2 | OXYGEN SATURATION: 92 % | RESPIRATION RATE: 18 BRPM

## 2023-12-19 DIAGNOSIS — E78.5 HYPERLIPIDEMIA, UNSPECIFIED HYPERLIPIDEMIA TYPE: ICD-10-CM

## 2023-12-19 DIAGNOSIS — M10.9 GOUT, UNSPECIFIED CAUSE, UNSPECIFIED CHRONICITY, UNSPECIFIED SITE: ICD-10-CM

## 2023-12-19 DIAGNOSIS — I10 HYPERTENSION, UNSPECIFIED TYPE: ICD-10-CM

## 2023-12-19 DIAGNOSIS — Z00.00 ROUTINE GENERAL MEDICAL EXAMINATION AT HEALTH CARE FACILITY: Primary | ICD-10-CM

## 2023-12-19 LAB
ALBUMIN SERPL BCP-MCNC: 4.4 G/DL (ref 3.4–5)
ALP SERPL-CCNC: 52 U/L (ref 33–136)
ALT SERPL W P-5'-P-CCNC: 29 U/L (ref 10–52)
ANION GAP SERPL CALC-SCNC: 14 MMOL/L (ref 10–20)
AST SERPL W P-5'-P-CCNC: 16 U/L (ref 9–39)
BASOPHILS # BLD AUTO: 0.05 X10*3/UL (ref 0–0.1)
BASOPHILS NFR BLD AUTO: 0.4 %
BILIRUB SERPL-MCNC: 0.7 MG/DL (ref 0–1.2)
BUN SERPL-MCNC: 27 MG/DL (ref 6–23)
CALCIUM SERPL-MCNC: 9.9 MG/DL (ref 8.6–10.3)
CHLORIDE SERPL-SCNC: 102 MMOL/L (ref 98–107)
CO2 SERPL-SCNC: 28 MMOL/L (ref 21–32)
CREAT SERPL-MCNC: 1.08 MG/DL (ref 0.5–1.3)
EOSINOPHIL # BLD AUTO: 0.75 X10*3/UL (ref 0–0.4)
EOSINOPHIL NFR BLD AUTO: 6.6 %
ERYTHROCYTE [DISTWIDTH] IN BLOOD BY AUTOMATED COUNT: 13.1 % (ref 11.5–14.5)
GFR SERPL CREATININE-BSD FRML MDRD: 70 ML/MIN/1.73M*2
GLUCOSE SERPL-MCNC: 98 MG/DL (ref 74–99)
HCT VFR BLD AUTO: 47.8 % (ref 41–52)
HGB BLD-MCNC: 15.7 G/DL (ref 13.5–17.5)
IMM GRANULOCYTES # BLD AUTO: 0.03 X10*3/UL (ref 0–0.5)
IMM GRANULOCYTES NFR BLD AUTO: 0.3 % (ref 0–0.9)
LYMPHOCYTES # BLD AUTO: 3.59 X10*3/UL (ref 0.8–3)
LYMPHOCYTES NFR BLD AUTO: 31.5 %
MCH RBC QN AUTO: 31.8 PG (ref 26–34)
MCHC RBC AUTO-ENTMCNC: 32.8 G/DL (ref 32–36)
MCV RBC AUTO: 97 FL (ref 80–100)
MONOCYTES # BLD AUTO: 0.85 X10*3/UL (ref 0.05–0.8)
MONOCYTES NFR BLD AUTO: 7.4 %
NEUTROPHILS # BLD AUTO: 6.14 X10*3/UL (ref 1.6–5.5)
NEUTROPHILS NFR BLD AUTO: 53.8 %
NRBC BLD-RTO: 0 /100 WBCS (ref 0–0)
PLATELET # BLD AUTO: 236 X10*3/UL (ref 150–450)
POTASSIUM SERPL-SCNC: 4.1 MMOL/L (ref 3.5–5.3)
PROT SERPL-MCNC: 6.9 G/DL (ref 6.4–8.2)
RBC # BLD AUTO: 4.94 X10*6/UL (ref 4.5–5.9)
SODIUM SERPL-SCNC: 140 MMOL/L (ref 136–145)
WBC # BLD AUTO: 11.4 X10*3/UL (ref 4.4–11.3)

## 2023-12-19 PROCEDURE — 99214 OFFICE O/P EST MOD 30 MIN: CPT | Performed by: STUDENT IN AN ORGANIZED HEALTH CARE EDUCATION/TRAINING PROGRAM

## 2023-12-19 PROCEDURE — G0439 PPPS, SUBSEQ VISIT: HCPCS | Performed by: STUDENT IN AN ORGANIZED HEALTH CARE EDUCATION/TRAINING PROGRAM

## 2023-12-19 PROCEDURE — 80053 COMPREHEN METABOLIC PANEL: CPT

## 2023-12-19 PROCEDURE — 1159F MED LIST DOCD IN RCRD: CPT | Performed by: STUDENT IN AN ORGANIZED HEALTH CARE EDUCATION/TRAINING PROGRAM

## 2023-12-19 PROCEDURE — 1036F TOBACCO NON-USER: CPT | Performed by: STUDENT IN AN ORGANIZED HEALTH CARE EDUCATION/TRAINING PROGRAM

## 2023-12-19 PROCEDURE — 36415 COLL VENOUS BLD VENIPUNCTURE: CPT

## 2023-12-19 PROCEDURE — 3078F DIAST BP <80 MM HG: CPT | Performed by: STUDENT IN AN ORGANIZED HEALTH CARE EDUCATION/TRAINING PROGRAM

## 2023-12-19 PROCEDURE — 1160F RVW MEDS BY RX/DR IN RCRD: CPT | Performed by: STUDENT IN AN ORGANIZED HEALTH CARE EDUCATION/TRAINING PROGRAM

## 2023-12-19 PROCEDURE — 85025 COMPLETE CBC W/AUTO DIFF WBC: CPT

## 2023-12-19 PROCEDURE — 1170F FXNL STATUS ASSESSED: CPT | Performed by: STUDENT IN AN ORGANIZED HEALTH CARE EDUCATION/TRAINING PROGRAM

## 2023-12-19 PROCEDURE — 3074F SYST BP LT 130 MM HG: CPT | Performed by: STUDENT IN AN ORGANIZED HEALTH CARE EDUCATION/TRAINING PROGRAM

## 2023-12-19 PROCEDURE — 1126F AMNT PAIN NOTED NONE PRSNT: CPT | Performed by: STUDENT IN AN ORGANIZED HEALTH CARE EDUCATION/TRAINING PROGRAM

## 2023-12-19 ASSESSMENT — ACTIVITIES OF DAILY LIVING (ADL)
DRESSING: INDEPENDENT
DOING_HOUSEWORK: INDEPENDENT
TAKING_MEDICATION: INDEPENDENT
MANAGING_FINANCES: INDEPENDENT
BATHING: INDEPENDENT
GROCERY_SHOPPING: INDEPENDENT

## 2023-12-19 ASSESSMENT — PATIENT HEALTH QUESTIONNAIRE - PHQ9
1. LITTLE INTEREST OR PLEASURE IN DOING THINGS: NOT AT ALL
SUM OF ALL RESPONSES TO PHQ9 QUESTIONS 1 AND 2: 0
2. FEELING DOWN, DEPRESSED OR HOPELESS: NOT AT ALL

## 2023-12-19 NOTE — PROGRESS NOTES
"Subjective   Reason for Visit: Cl Feliz is an 78 y.o. male here for a Medicare Wellness visit.     Past Medical, Surgical, and Family History reviewed and updated in chart.    Reviewed all medications by prescribing practitioner or clinical pharmacist (such as prescriptions, OTCs, herbal therapies and supplements) and documented in the medical record.      Numbness of his back improving with the home physical therapy     Falling asleep when he is seated. Currently being evaluated for sleep apnea. Taking his machine. Mask is currently leaking. His machine is about 8 month. Sleep supplies are not being regularly changed.     Patient Care Team:  An Gutiérrez DO as PCP - General (Family Medicine)  An Gutiérrez DO as PCP - [employer] PCP     Objective   Vitals:  /50   Pulse 99   Resp 18   Ht 1.803 m (5' 11\")   Wt 103 kg (226 lb 6.4 oz)   SpO2 92%   BMI 31.58 kg/m²       Physical Exam  Vitals reviewed.   Constitutional:       General: He is not in acute distress.     Appearance: He is not ill-appearing.   HENT:      Right Ear: Tympanic membrane and ear canal normal.      Left Ear: Tympanic membrane and ear canal normal.      Mouth/Throat:      Mouth: Mucous membranes are moist.      Pharynx: Oropharynx is clear. No oropharyngeal exudate or posterior oropharyngeal erythema.   Eyes:      Extraocular Movements: Extraocular movements intact.      Conjunctiva/sclera: Conjunctivae normal.      Pupils: Pupils are equal, round, and reactive to light.   Neck:      Vascular: No carotid bruit.   Cardiovascular:      Rate and Rhythm: Normal rate and regular rhythm.      Heart sounds: Murmur heard.      No gallop.   Pulmonary:      Effort: Pulmonary effort is normal.      Breath sounds: Normal breath sounds. No wheezing, rhonchi or rales.   Abdominal:      General: Abdomen is flat. Bowel sounds are normal.      Palpations: Abdomen is soft.      Tenderness: There is no abdominal tenderness. "   Musculoskeletal:      Cervical back: Neck supple.      Left lower leg: No edema.   Skin:     General: Skin is warm and dry.      Findings: No rash.   Neurological:      General: No focal deficit present.      Mental Status: He is alert and oriented to person, place, and time.      Gait: Gait normal.   Psychiatric:         Mood and Affect: Mood normal.         Behavior: Behavior normal.       Assessment/Plan   Problem List Items Addressed This Visit       Gout    HTN (hypertension)    Relevant Orders    CBC and Auto Differential    Comprehensive metabolic panel    Hyperlipidemia - Primary     Daytime sleepiness  Likely 2/2 to poor CPAP machine   Need for repeat sleep test and machine       Lumbar Radiculopathy  Improved with physical therapy   Continue with therapy at home      HTN  BP was good at 114/50mmHg  refilled lisinopril 20mg daily.  Physical exam was stable. Discussed maintaining diets such as DASH to help maintain normal Blood pressure. Encouraged regular exercise for a total of 120min weekly. We will fu labs in 1-3 days. For now there will be no change in medical management. All questions and concerns were addressed. Pt will follow up in 4-6months or sooner if needed.  - seeing Cardiology- Dr. Betancourt/Zehra Jeffrey      DSL   continue atorvastatin 40mg daily   followed by cardio     Gout  - on allopurinol 300 mg daily  - no gouty attacks recently     S/P cervical surgery c4-c7 2018/ lumbar radiculopathy  - seeing Ortho- Dr. Jaramillo  - meloxicam 15mg   - continue with physical therapy       PVD  stable    WES   sleep medicine referral given   RTC in 6 months

## 2024-01-02 NOTE — PROGRESS NOTES
[facility]  New Visit Note        HISTORY OF PRESENT ILLNESS     The patient's referring provider is: Milton Betancourt MD    HISTORY OF PRESENT ILLNESS   Cl Feliz is a 78 y.o. male who presents to a [facility] for a sleep medicine evaluation with concerns of Consult and Sleep Apnea.     PAST SLEEP HISTORY    Patient has the following sleep-related diagnoses and sleep study results: Severe sleep apnea - done at [facility]    CURRENT HISTORY    On today's visit, the patient reports he uses BiPAP nightly. He feels better with it in terms of sleep quality and daytime sleepiness.    Pressure feels too high and he notes a lot of leak. Using f30 mask that has not been replaced in 2-3 years. Doesn't add water.    PAP Adherence:  Durable Medical Equipment Company: [employer]  Pressure Range: 14/10 cm H2O Mask: f30    A PAP adherence download was obtained and data was reviewed personally today in clinic. (see scanned document in EPIC)  % days >4 hours use: 100  Average use : 7.8 hr  95% leak : 94 L/min  AHI: 54.5     Sleep schedule  on weekdays / work days:  Usual Bedtime  10 p  Falls asleep around  10:05 p  Wake time  7:30 a    Sleep schedule  on weekends/non work days :  same    Naps:   5-20 minutes daily    Average sleep duration ? hours/day    Preferred sleeping position: ?    Sleep-related ROS:    Sleep Initiation: no problems going to sleep    Sleep Maintenance: denies problematic awakenings    Breathing during sleep: snoring and witnessed apneas    RLS screen:  Urge to move legs, worse PM, mvmt helps, not worse with rest    Sleep-related behaviors:  Hypnagogic hallucinations    Daytime Symptoms  On awakening patient reports: dry mouth    Patient report some daytime symptoms including: none    Recreational drug use  Caffeine consumption:  4/day  Alcohol consumption: occasional  Smoking: never  Marijuana: never    ESS: 15   JANNETH: 13        REVIEW OF SYSTEMS      All other systems negative      ALLERGIES AND MEDICATIONS     ALLERGIES  Allergies   Allergen Reactions    Penicillin G Unknown    Penicillins Unknown       MEDICATIONS  Current Outpatient Medications   Medication Sig Dispense Refill    allopurinol (Zyloprim) 300 mg tablet take 1 tablet by mouth every day 90 tablet 1    aspirin 81 mg EC tablet Take 1 tablet (81 mg) by mouth once daily.      atorvastatin (Lipitor) 40 mg tablet Take 1 tablet (40 mg) by mouth once daily.      calcium polycarbophil (FIBERCON ORAL) Take 2 tablets by mouth once daily at bedtime.      cholecalciferol (Vitamin D-3) 25 MCG (1000 UT) capsule Take 2 capsules (50 mcg) by mouth once daily.      coenzyme Q-10 100 mg capsule Take by mouth.      fluoride, sodium, (Prevident 5000 Booster) 1.1 % dental paste BRUSH TWICE A DAY. NO EATING / RINSING / DRINKING FOR 30 MINUTES AFTER      lisinopril 20 mg tablet Take 1 tablet (20 mg) by mouth once daily.      meloxicam (Mobic) 15 mg tablet take one tablet by mouth every day as needed 90 tablet 0    multivit-min/folic acid/vit K1 (MULTI FOR HIM, NO IRON, ORAL) Take by mouth.      omeprazole (PriLOSEC) 20 mg DR capsule Take 1 capsule (20 mg) by mouth once daily.      sildenafil (Viagra) 50 mg tablet TAKE 1 TABLET BY MOUTH EVERY DAY AS NEEDED APPROXIMATELY 1 HOUR BEFORE SEXUAL ACTIVITY      triamcinolone (Kenalog) 0.1 % ointment apply twice daily to affected areas for no more than 2 weeks per month.      albuterol 90 mcg/actuation inhaler Inhale 1 puff every 4 hours if needed.      fluocinolone (DermOtic) 0.01 % ear drops Instill 5 drops in the affected ear once a day as needed for itching (Patient not taking: Reported on 1/3/2024) 20 mL 3     No current facility-administered medications for this visit.         PAST HISTORY     PAST MEDICAL HISTORY  He  has a past medical history of Personal history of other endocrine, nutritional and metabolic disease (01/28/2016) and Supraventricular tachycardia  "(06/20/2023).    PAST SURGICAL HISTORY:  Past Surgical History:   Procedure Laterality Date    OTHER SURGICAL HISTORY  07/29/2019    Neck surgery    VASECTOMY  01/28/2016    Surgery Vas Deferens Vasectomy       FAMILY HISTORY  Family History   Problem Relation Name Age of Onset    Breast cancer Mother      Accidental death Father      No Known Problems Sister      Hypertension Brother         SOCIAL HISTORY  He  reports that he has never smoked. He has never used smokeless tobacco. He reports current alcohol use. He reports that he does not currently use drugs.       PHYSICAL EXAM     VITAL SIGNS: /76   Pulse 96   Ht 1.803 m (5' 11\")   Wt 102 kg (224 lb)   SpO2 92%   BMI 31.24 kg/m²      CURRENT WEIGHT: [unfilled]  BMI: [unfilled]  PREVIOUS WEIGHTS:  Wt Readings from Last 3 Encounters:   01/03/24 102 kg (224 lb)   12/19/23 103 kg (226 lb 6.4 oz)   12/07/23 103 kg (226 lb)       PHYSICAL EXAM: GENERAL: alert oriented x 3 pleasant and cooperative no acute distress  MODIFIED HAYS SCORE: 2+  MODIFIED MALLAMPATI SCORE: 2+  UVULA: midline  TONGUE SCALLOPING: normal  NECK EXAM: normal supple no adenopathy    RESULTS/DATA     No results found for: \"IRON\", \"TRANSFERRIN\", \"IRONSAT\", \"TIBC\", \"FERRITIN\"    ASSESSMENT/PLAN     Mr. Feliz is a 78 y.o. male and He was referred to the [facility] for the following issues:    OBSTRUCTIVE SLEEP APNEA,   -Download shows sleep apnea very poorly controlled with high mask leak - he does note he does not snore and feels better with use  -Extensive trouble shooting today to improve mask fit and tolerance of BiPAP  -Pressure lowered to max ipap 14, min epap 4, ps 4 cm H2O  -Adjusted mask and reduced significantly  -Will try n20 mask with new BiPAP  -Will order new auto BiPAP from [facility] with pressure max ipap 14, min epap 4, ps 4 cm H2O  -Goal of CPAP includes less daytime sleepiness, less waking at night, better BP control    OBESITY  -BMI 31 " TODAY  -Would benefit from better controlled WES    HYPERTENSION  -/76 today  -Well controlled with current medications     RLS  -Will address at fuv    Follow up 2 months to review tolerance of new BiPAP

## 2024-01-03 ENCOUNTER — OFFICE VISIT (OUTPATIENT)
Dept: SLEEP MEDICINE | Facility: CLINIC | Age: 79
End: 2024-01-03
Payer: MEDICARE

## 2024-01-03 VITALS
HEART RATE: 96 BPM | WEIGHT: 224 LBS | OXYGEN SATURATION: 92 % | HEIGHT: 71 IN | BODY MASS INDEX: 31.36 KG/M2 | SYSTOLIC BLOOD PRESSURE: 132 MMHG | DIASTOLIC BLOOD PRESSURE: 76 MMHG

## 2024-01-03 DIAGNOSIS — E66.9 CLASS 1 OBESITY WITH BODY MASS INDEX (BMI) OF 31.0 TO 31.9 IN ADULT, UNSPECIFIED OBESITY TYPE, UNSPECIFIED WHETHER SERIOUS COMORBIDITY PRESENT: ICD-10-CM

## 2024-01-03 DIAGNOSIS — I10 HYPERTENSION, UNSPECIFIED TYPE: ICD-10-CM

## 2024-01-03 DIAGNOSIS — G47.33 OSA (OBSTRUCTIVE SLEEP APNEA): Primary | ICD-10-CM

## 2024-01-03 PROCEDURE — 99214 OFFICE O/P EST MOD 30 MIN: CPT | Performed by: PHYSICIAN ASSISTANT

## 2024-01-03 PROCEDURE — 3078F DIAST BP <80 MM HG: CPT | Performed by: PHYSICIAN ASSISTANT

## 2024-01-03 PROCEDURE — 1036F TOBACCO NON-USER: CPT | Performed by: PHYSICIAN ASSISTANT

## 2024-01-03 PROCEDURE — 1160F RVW MEDS BY RX/DR IN RCRD: CPT | Performed by: PHYSICIAN ASSISTANT

## 2024-01-03 PROCEDURE — 1159F MED LIST DOCD IN RCRD: CPT | Performed by: PHYSICIAN ASSISTANT

## 2024-01-03 PROCEDURE — 99204 OFFICE O/P NEW MOD 45 MIN: CPT | Performed by: PHYSICIAN ASSISTANT

## 2024-01-03 PROCEDURE — 3075F SYST BP GE 130 - 139MM HG: CPT | Performed by: PHYSICIAN ASSISTANT

## 2024-01-03 PROCEDURE — 1126F AMNT PAIN NOTED NONE PRSNT: CPT | Performed by: PHYSICIAN ASSISTANT

## 2024-01-03 ASSESSMENT — SLEEP AND FATIGUE QUESTIONNAIRES
WAKING_TOO_EARLY: MILD
HOW LIKELY ARE YOU TO NOD OFF OR FALL ASLEEP IN A CAR, WHILE STOPPED FOR A FEW MINUTES IN TRAFFIC: SLIGHT CHANCE OF DOZING
HOW LIKELY ARE YOU TO NOD OFF OR FALL ASLEEP WHILE WATCHING TV: HIGH CHANCE OF DOZING
DIFFICULTY_STAYING_ASLEEP: MODERATE
WORRIED_DISTRESSED_DUE_TO_SLEEP: MUCH
SITING INACTIVE IN A PUBLIC PLACE LIKE A CLASS ROOM OR A MOVIE THEATER: SLIGHT CHANCE OF DOZING
HOW LIKELY ARE YOU TO NOD OFF OR FALL ASLEEP WHILE SITTING QUIETLY AFTER LUNCH WITHOUT ALCOHOL: MODERATE CHANCE OF DOZING
HOW LIKELY ARE YOU TO NOD OFF OR FALL ASLEEP WHILE SITTING AND READING: HIGH CHANCE OF DOZING
HOW LIKELY ARE YOU TO NOD OFF OR FALL ASLEEP WHEN YOU ARE A PASSENGER IN A CAR FOR AN HOUR WITHOUT A BREAK: MODERATE CHANCE OF DOZING
SLEEP_PROBLEM_INTERFERES_DAILY_ACTIVITIES: A LITTLE
SLEEP_PROBLEM_NOTICEABLE_TO_OTHERS: VERY MUCH NOTICEABLE
ESS-CHAD TOTAL SCORE: 15
HOW LIKELY ARE YOU TO NOD OFF OR FALL ASLEEP WHILE LYING DOWN TO REST IN THE AFTERNOON WHEN CIRCUMSTANCES PERMIT: MODERATE CHANCE OF DOZING
HOW LIKELY ARE YOU TO NOD OFF OR FALL ASLEEP WHILE SITTING AND TALKING TO SOMEONE: SLIGHT CHANCE OF DOZING
SATISFACTION_WITH_CURRENT_SLEEP_PATTERN: SATISFIED

## 2024-01-03 ASSESSMENT — LIFESTYLE VARIABLES
HOW MANY STANDARD DRINKS CONTAINING ALCOHOL DO YOU HAVE ON A TYPICAL DAY: 1 OR 2
HOW MANY STANDARD DRINKS CONTAINING ALCOHOL DO YOU HAVE ON A TYPICAL DAY: 1 OR 2
HOW OFTEN DO YOU HAVE SIX OR MORE DRINKS ON ONE OCCASION: NEVER
HOW OFTEN DO YOU HAVE A DRINK CONTAINING ALCOHOL: 2-4 TIMES A MONTH
HOW OFTEN DO YOU HAVE SIX OR MORE DRINKS ON ONE OCCASION: NEVER
AUDIT-C TOTAL SCORE: 1
SKIP TO QUESTIONS 9-10: 1
AUDIT-C TOTAL SCORE: 2
SKIP TO QUESTIONS 9-10: 1
HOW OFTEN DO YOU HAVE A DRINK CONTAINING ALCOHOL: MONTHLY OR LESS

## 2024-01-03 ASSESSMENT — PATIENT HEALTH QUESTIONNAIRE - PHQ9
1. LITTLE INTEREST OR PLEASURE IN DOING THINGS: NOT AT ALL
2. FEELING DOWN, DEPRESSED OR HOPELESS: NOT AT ALL
SUM OF ALL RESPONSES TO PHQ9 QUESTIONS 1 & 2: 0

## 2024-01-03 ASSESSMENT — PAIN SCALES - GENERAL: PAINLEVEL: 0-NO PAIN

## 2024-01-03 NOTE — PATIENT INSTRUCTIONS
Great seeing you today,    Order sent for new BiPAP to [facility] with auto pressure. If you feel uncomfortable with pressure settings let me know and I can change them online (when you get new machine). We will also try n20 mask in place of the f30.    Some other mask options:    Resmed N30i nasal mask (tube on top of head) - good option if you toss and turn a lot  Resmed N30 nasal mask (tube in front)  Hills vinay fx nasal pillow mask (tube in front)  Resmed P10 nasal pillow mask (tube in front)  Resmed P30i nasal mask (tube on top of head) - good option if you toss and turn a lot    Otoniel Pino PA-C    IMPORTANT PHONE NUMBERS     [phone] ([id number])  [employer] ([employer]): [phone]  For clinical questions and refilling prescriptions: [phone]  [employer] (For Maite/Odette): P: [phone]

## 2024-01-14 DIAGNOSIS — I10 HYPERTENSION, UNSPECIFIED TYPE: ICD-10-CM

## 2024-01-15 RX ORDER — LISINOPRIL 20 MG/1
20 TABLET ORAL DAILY
Qty: 90 TABLET | Refills: 1 | Status: SHIPPED | OUTPATIENT
Start: 2024-01-15

## 2024-02-03 ENCOUNTER — APPOINTMENT (OUTPATIENT)
Dept: RADIOLOGY | Facility: HOSPITAL | Age: 79
End: 2024-02-03
Payer: MEDICARE

## 2024-02-03 ENCOUNTER — HOSPITAL ENCOUNTER (EMERGENCY)
Facility: HOSPITAL | Age: 79
Discharge: HOME | End: 2024-02-03
Payer: MEDICARE

## 2024-02-03 VITALS
WEIGHT: 227.74 LBS | TEMPERATURE: 98.1 F | SYSTOLIC BLOOD PRESSURE: 136 MMHG | OXYGEN SATURATION: 97 % | DIASTOLIC BLOOD PRESSURE: 87 MMHG | BODY MASS INDEX: 31.88 KG/M2 | HEIGHT: 71 IN | HEART RATE: 84 BPM | RESPIRATION RATE: 16 BRPM
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
## 2024-02-03 DIAGNOSIS — M54.2 NECK PAIN: Primary | ICD-10-CM

## 2024-02-03 PROCEDURE — 72125 CT NECK SPINE W/O DYE: CPT

## 2024-02-03 PROCEDURE — 2500000005 HC RX 250 GENERAL PHARMACY W/O HCPCS: Performed by: PHYSICIAN ASSISTANT

## 2024-02-03 PROCEDURE — 96372 THER/PROPH/DIAG INJ SC/IM: CPT

## 2024-02-03 PROCEDURE — 74018 RADEX ABDOMEN 1 VIEW: CPT

## 2024-02-03 PROCEDURE — 2500000004 HC RX 250 GENERAL PHARMACY W/ HCPCS (ALT 636 FOR OP/ED): Performed by: PHYSICIAN ASSISTANT

## 2024-02-03 PROCEDURE — 99284 EMERGENCY DEPT VISIT MOD MDM: CPT | Mod: 25

## 2024-02-03 RX ORDER — KETOROLAC TROMETHAMINE 30 MG/ML
15 INJECTION, SOLUTION INTRAMUSCULAR; INTRAVENOUS ONCE
Status: COMPLETED | OUTPATIENT
Start: 2024-02-03 | End: 2024-02-03

## 2024-02-03 RX ORDER — LIDOCAINE 560 MG/1
1 PATCH PERCUTANEOUS; TOPICAL; TRANSDERMAL DAILY
Status: DISCONTINUED | OUTPATIENT
Start: 2024-02-03 | End: 2024-02-03 | Stop reason: HOSPADM

## 2024-02-03 RX ADMIN — KETOROLAC TROMETHAMINE 15 MG: 30 INJECTION INTRAMUSCULAR; INTRAVENOUS at 19:45

## 2024-02-03 RX ADMIN — LIDOCAINE 1 PATCH: 4 PATCH TOPICAL at 19:45

## 2024-02-03 ASSESSMENT — PAIN DESCRIPTION - FREQUENCY: FREQUENCY: CONSTANT/CONTINUOUS

## 2024-02-03 ASSESSMENT — PAIN DESCRIPTION - ONSET: ONSET: SUDDEN

## 2024-02-03 ASSESSMENT — PAIN SCALES - GENERAL: PAINLEVEL_OUTOF10: 3

## 2024-02-03 ASSESSMENT — PAIN DESCRIPTION - PROGRESSION: CLINICAL_PROGRESSION: NOT CHANGED

## 2024-02-03 ASSESSMENT — PAIN DESCRIPTION - PAIN TYPE: TYPE: ACUTE PAIN

## 2024-02-03 ASSESSMENT — PAIN DESCRIPTION - LOCATION: LOCATION: ABDOMEN

## 2024-02-03 ASSESSMENT — PAIN - FUNCTIONAL ASSESSMENT: PAIN_FUNCTIONAL_ASSESSMENT: 0-10

## 2024-02-03 ASSESSMENT — COLUMBIA-SUICIDE SEVERITY RATING SCALE - C-SSRS
6. HAVE YOU EVER DONE ANYTHING, STARTED TO DO ANYTHING, OR PREPARED TO DO ANYTHING TO END YOUR LIFE?: NO
1. IN THE PAST MONTH, HAVE YOU WISHED YOU WERE DEAD OR WISHED YOU COULD GO TO SLEEP AND NOT WAKE UP?: NO
2. HAVE YOU ACTUALLY HAD ANY THOUGHTS OF KILLING YOURSELF?: NO

## 2024-02-03 ASSESSMENT — PAIN DESCRIPTION - DESCRIPTORS: DESCRIPTORS: DULL

## 2024-02-03 ASSESSMENT — PAIN DESCRIPTION - ORIENTATION: ORIENTATION: LOWER;UPPER

## 2024-02-04 NOTE — DISCHARGE INSTRUCTIONS
Thank you for choosing  for your emergency care.    Please return to the Emergency Department immediately if new or worsening symptoms occur. Symptoms of that are most concerning include worsening pain, headache, lightheadedness, dizziness or syncopal episodes.    It is important to remember that your care does not end here and you must continue to monitor your condition closely. Please return to the emergency department for any worsening or concerning signs or symptoms as directed by our conversations and the discharge instructions. Otherwise please follow up with your doctor in 2 days if no better or worse. If you do not have a doctor please contact the referral number on your discharge instructions. Please contact any physician specialists provided in your discharge notes as it is very important to follow up with them regarding your condition. If you are unable to reach the physicians provided, please come back to the Emergency Department at any time.      As always, please take medications as directed. If you have any questions at all regarding your medications, please contact the pharmacist, the emergency department, or your doctor. Before taking any medication prescribed in the Emergency Department, please review the medication side effects and drug interactions as they may interact with your home medications.     Education materials have been provided to you about your encounter today.  Please review the attachments at your earliest convenience.
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
## 2024-02-10 DIAGNOSIS — M1A.0510: ICD-10-CM

## 2024-02-12 RX ORDER — MELOXICAM 15 MG/1
15 TABLET ORAL DAILY PRN
Qty: 90 TABLET | Refills: 0 | Status: SHIPPED | OUTPATIENT
Start: 2024-02-12 | End: 2024-05-15

## 2024-03-22 ENCOUNTER — APPOINTMENT (OUTPATIENT)
Dept: SLEEP MEDICINE | Facility: CLINIC | Age: 79
End: 2024-03-22
Payer: MEDICARE

## 2024-03-25 ENCOUNTER — OFFICE VISIT (OUTPATIENT)
Dept: PRIMARY CARE | Facility: CLINIC | Age: 79
End: 2024-03-25
Payer: MEDICARE

## 2024-03-25 VITALS
SYSTOLIC BLOOD PRESSURE: 112 MMHG | BODY MASS INDEX: 32.31 KG/M2 | RESPIRATION RATE: 17 BRPM | HEIGHT: 71 IN | OXYGEN SATURATION: 94 % | WEIGHT: 230.8 LBS | DIASTOLIC BLOOD PRESSURE: 58 MMHG | HEART RATE: 91 BPM

## 2024-03-25 DIAGNOSIS — M54.42 ACUTE LEFT-SIDED LOW BACK PAIN WITH LEFT-SIDED SCIATICA: Primary | ICD-10-CM

## 2024-03-25 PROCEDURE — 1036F TOBACCO NON-USER: CPT | Performed by: STUDENT IN AN ORGANIZED HEALTH CARE EDUCATION/TRAINING PROGRAM

## 2024-03-25 PROCEDURE — 99213 OFFICE O/P EST LOW 20 MIN: CPT | Performed by: STUDENT IN AN ORGANIZED HEALTH CARE EDUCATION/TRAINING PROGRAM

## 2024-03-25 PROCEDURE — 1159F MED LIST DOCD IN RCRD: CPT | Performed by: STUDENT IN AN ORGANIZED HEALTH CARE EDUCATION/TRAINING PROGRAM

## 2024-03-25 PROCEDURE — 1160F RVW MEDS BY RX/DR IN RCRD: CPT | Performed by: STUDENT IN AN ORGANIZED HEALTH CARE EDUCATION/TRAINING PROGRAM

## 2024-03-25 PROCEDURE — 1123F ACP DISCUSS/DSCN MKR DOCD: CPT | Performed by: STUDENT IN AN ORGANIZED HEALTH CARE EDUCATION/TRAINING PROGRAM

## 2024-03-25 PROCEDURE — 3074F SYST BP LT 130 MM HG: CPT | Performed by: STUDENT IN AN ORGANIZED HEALTH CARE EDUCATION/TRAINING PROGRAM

## 2024-03-25 PROCEDURE — 3078F DIAST BP <80 MM HG: CPT | Performed by: STUDENT IN AN ORGANIZED HEALTH CARE EDUCATION/TRAINING PROGRAM

## 2024-03-25 RX ORDER — CYCLOBENZAPRINE HCL 10 MG
10 TABLET ORAL 2 TIMES DAILY PRN
Qty: 30 TABLET | Refills: 0 | Status: SHIPPED | OUTPATIENT
Start: 2024-03-25 | End: 2024-05-24

## 2024-03-25 RX ORDER — METHYLPREDNISOLONE 4 MG/1
TABLET ORAL
Qty: 21 TABLET | Refills: 0 | Status: SHIPPED | OUTPATIENT
Start: 2024-03-25 | End: 2024-04-01

## 2024-03-25 ASSESSMENT — PATIENT HEALTH QUESTIONNAIRE - PHQ9
2. FEELING DOWN, DEPRESSED OR HOPELESS: NOT AT ALL
1. LITTLE INTEREST OR PLEASURE IN DOING THINGS: NOT AT ALL
SUM OF ALL RESPONSES TO PHQ9 QUESTIONS 1 AND 2: 0
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
## 2024-04-05 DIAGNOSIS — M10.9 GOUT, UNSPECIFIED CAUSE, UNSPECIFIED CHRONICITY, UNSPECIFIED SITE: Primary | ICD-10-CM

## 2024-04-05 RX ORDER — ALLOPURINOL 300 MG/1
300 TABLET ORAL DAILY
Qty: 90 TABLET | Refills: 3 | Status: SHIPPED | OUTPATIENT
Start: 2024-04-05

## 2024-04-15 ENCOUNTER — OFFICE VISIT (OUTPATIENT)
Dept: SLEEP MEDICINE | Facility: CLINIC | Age: 79
End: 2024-04-15
Payer: MEDICARE

## 2024-04-15 VITALS
HEIGHT: 71 IN | OXYGEN SATURATION: 96 % | BODY MASS INDEX: 30.52 KG/M2 | SYSTOLIC BLOOD PRESSURE: 126 MMHG | WEIGHT: 218 LBS | DIASTOLIC BLOOD PRESSURE: 70 MMHG | HEART RATE: 96 BPM

## 2024-04-15 DIAGNOSIS — E66.9 CLASS 1 OBESITY WITH BODY MASS INDEX (BMI) OF 31.0 TO 31.9 IN ADULT, UNSPECIFIED OBESITY TYPE, UNSPECIFIED WHETHER SERIOUS COMORBIDITY PRESENT: ICD-10-CM

## 2024-04-15 DIAGNOSIS — I10 HYPERTENSION, UNSPECIFIED TYPE: ICD-10-CM

## 2024-04-15 DIAGNOSIS — G47.33 OSA (OBSTRUCTIVE SLEEP APNEA): Primary | ICD-10-CM

## 2024-04-15 PROCEDURE — 3074F SYST BP LT 130 MM HG: CPT | Performed by: PHYSICIAN ASSISTANT

## 2024-04-15 PROCEDURE — 1036F TOBACCO NON-USER: CPT | Performed by: PHYSICIAN ASSISTANT

## 2024-04-15 PROCEDURE — 1159F MED LIST DOCD IN RCRD: CPT | Performed by: PHYSICIAN ASSISTANT

## 2024-04-15 PROCEDURE — 3078F DIAST BP <80 MM HG: CPT | Performed by: PHYSICIAN ASSISTANT

## 2024-04-15 PROCEDURE — 99214 OFFICE O/P EST MOD 30 MIN: CPT | Performed by: PHYSICIAN ASSISTANT

## 2024-04-15 PROCEDURE — 1160F RVW MEDS BY RX/DR IN RCRD: CPT | Performed by: PHYSICIAN ASSISTANT

## 2024-04-15 PROCEDURE — 1123F ACP DISCUSS/DSCN MKR DOCD: CPT | Performed by: PHYSICIAN ASSISTANT

## 2024-04-15 PROCEDURE — 1126F AMNT PAIN NOTED NONE PRSNT: CPT | Performed by: PHYSICIAN ASSISTANT

## 2024-04-15 ASSESSMENT — SLEEP AND FATIGUE QUESTIONNAIRES
HOW LIKELY ARE YOU TO NOD OFF OR FALL ASLEEP WHILE WATCHING TV: WOULD NEVER DOZE
HOW LIKELY ARE YOU TO NOD OFF OR FALL ASLEEP WHEN YOU ARE A PASSENGER IN A CAR FOR AN HOUR WITHOUT A BREAK: WOULD NEVER DOZE
HOW LIKELY ARE YOU TO NOD OFF OR FALL ASLEEP WHILE SITTING AND TALKING TO SOMEONE: WOULD NEVER DOZE
HOW LIKELY ARE YOU TO NOD OFF OR FALL ASLEEP IN A CAR, WHILE STOPPED FOR A FEW MINUTES IN TRAFFIC: WOULD NEVER DOZE
HOW LIKELY ARE YOU TO NOD OFF OR FALL ASLEEP WHILE LYING DOWN TO REST IN THE AFTERNOON WHEN CIRCUMSTANCES PERMIT: WOULD NEVER DOZE
HOW LIKELY ARE YOU TO NOD OFF OR FALL ASLEEP WHILE SITTING AND READING: MODERATE CHANCE OF DOZING
SITING INACTIVE IN A PUBLIC PLACE LIKE A CLASS ROOM OR A MOVIE THEATER: MODERATE CHANCE OF DOZING
ESS-CHAD TOTAL SCORE: 4
HOW LIKELY ARE YOU TO NOD OFF OR FALL ASLEEP WHILE SITTING QUIETLY AFTER LUNCH WITHOUT ALCOHOL: WOULD NEVER DOZE

## 2024-04-15 ASSESSMENT — PAIN SCALES - GENERAL: PAINLEVEL: 0-NO PAIN

## 2024-04-15 NOTE — PROGRESS NOTES
"[facility]  Followup Visit Note    HISTORY OF PRESENT ILLNESS   Cl Feliz is a 78 y.o. male who presents to a [facility] for followup.       Current History    On today's visit, the patient reports after change to N20 nasal mask he is tolerating BiPAP very well and denies any remaining issues. He feels better on BIPAP.    He occasionally feels like it is difficult to breathe out but this is very rare.  No issues with pressure or humidity.    PAP Adherence:  Durable Medical Equipment Company: [employer]  Pressure Range: 14/4 PS 4 cm H2O Mask: n20    A PAP adherence download was obtained and data was reviewed personally today in clinic. (see scanned document in EPIC)  % days >4 hours use: 87  Average use : 8 hr 37 min  95% leak : 5.9 L/min  AHI: 2.5    Sleep Scales:  ESS: 4     REVIEW OF SYSTEMS    All other systems negative      PHYSICAL EXAM     VITAL SIGNS: /70   Pulse 96   Ht 1.803 m (5' 11\")   Wt 98.9 kg (218 lb)   SpO2 96%   BMI 30.40 kg/m²      CURRENT WEIGHT: [unfilled]  BMI: [unfilled]  PREVIOUS WEIGHTS:  Wt Readings from Last 3 Encounters:   04/15/24 98.9 kg (218 lb)   03/25/24 105 kg (230 lb 12.8 oz)   02/03/24 103 kg (227 lb 11.8 oz)       Constitutional: Alert and oriented, cooperative, no obvious distress   HEENT: Non icteric or anemic, EOM WNL bilaterally   Neck: Supple, no JVD, no goiter, no adenopathy, no rigidity  Extremities: No clubbing, no LL edema   Neuromuscular: Cranial nerves grossly intact, no focal deficits     RESULTS/DATA     No results found for: \"IRON\", \"TRANSFERRIN\", \"IRONSAT\", \"TIBC\", \"FERRITIN\"      ASSESSMENT/PLAN     Mr. Feliz is a 78 y.o. male and returns in followup for the following issues:    OBSTRUCTIVE SLEEP APNEA  -Benefiting from BiPAP  -Supplies ordered [employer]  -Tolerating mask, pressure, humidity well    BMI>30  -Body mass index is 30.4 kg/m².  today  -With sufficient weight loss may no longer require " treatment for WES    HYPERTENSION  BP Readings from Last 3 Encounters:   04/15/24 126/70   03/25/24 112/58   02/03/24 136/87      -Near goal with current medications     Follow up 1 year

## 2024-04-15 NOTE — PATIENT INSTRUCTIONS
Great seeing you today,    BiPAP data now looks very good. We will order supplies for your BiPAP from [employer].    Let us know if you have any issues with pressure, humidity, or mask.    We will plan on seeing you back in 1 year    Otoniel Pino PA-C    IMPORTANT PHONE NUMBERS     [phone] ([phone])  [facility] ([employer]): [phone]  For clinical questions and refilling prescriptions: [phone]  Katharine Pak (For [place]): P: [phone]

## 2024-05-15 DIAGNOSIS — M1A.0510: ICD-10-CM

## 2024-05-15 RX ORDER — MELOXICAM 15 MG/1
15 TABLET ORAL DAILY PRN
Qty: 90 TABLET | Refills: 0 | Status: SHIPPED | OUTPATIENT
Start: 2024-05-15

## 2024-05-16 ENCOUNTER — TELEPHONE (OUTPATIENT)
Dept: PRIMARY CARE | Facility: CLINIC | Age: 79
End: 2024-05-16
Payer: MEDICARE

## 2024-05-20 ENCOUNTER — OFFICE VISIT (OUTPATIENT)
Dept: PRIMARY CARE | Facility: CLINIC | Age: 79
End: 2024-05-20
Payer: MEDICARE

## 2024-05-20 ENCOUNTER — LAB (OUTPATIENT)
Dept: LAB | Facility: LAB | Age: 79
End: 2024-05-20
Payer: MEDICARE

## 2024-05-20 VITALS
WEIGHT: 228.4 LBS | BODY MASS INDEX: 31.98 KG/M2 | HEIGHT: 71 IN | HEART RATE: 82 BPM | OXYGEN SATURATION: 95 % | SYSTOLIC BLOOD PRESSURE: 126 MMHG | RESPIRATION RATE: 16 BRPM | DIASTOLIC BLOOD PRESSURE: 64 MMHG

## 2024-05-20 DIAGNOSIS — R31.9 HEMATURIA, UNSPECIFIED TYPE: ICD-10-CM

## 2024-05-20 DIAGNOSIS — R31.9 HEMATURIA, UNSPECIFIED TYPE: Primary | ICD-10-CM

## 2024-05-20 LAB
ALBUMIN SERPL BCP-MCNC: 4.4 G/DL (ref 3.4–5)
ALP SERPL-CCNC: 58 U/L (ref 33–136)
ALT SERPL W P-5'-P-CCNC: 29 U/L (ref 10–52)
ANION GAP SERPL CALC-SCNC: 15 MMOL/L (ref 10–20)
AST SERPL W P-5'-P-CCNC: 18 U/L (ref 9–39)
BASOPHILS # BLD AUTO: 0.03 X10*3/UL (ref 0–0.1)
BASOPHILS NFR BLD AUTO: 0.3 %
BILIRUB SERPL-MCNC: 0.8 MG/DL (ref 0–1.2)
BUN SERPL-MCNC: 25 MG/DL (ref 6–23)
CALCIUM SERPL-MCNC: 9.8 MG/DL (ref 8.6–10.3)
CHLORIDE SERPL-SCNC: 103 MMOL/L (ref 98–107)
CO2 SERPL-SCNC: 24 MMOL/L (ref 21–32)
CREAT SERPL-MCNC: 1.12 MG/DL (ref 0.5–1.3)
EGFRCR SERPLBLD CKD-EPI 2021: 67 ML/MIN/1.73M*2
EOSINOPHIL # BLD AUTO: 0.49 X10*3/UL (ref 0–0.4)
EOSINOPHIL NFR BLD AUTO: 5.3 %
ERYTHROCYTE [DISTWIDTH] IN BLOOD BY AUTOMATED COUNT: 13.2 % (ref 11.5–14.5)
GLUCOSE SERPL-MCNC: 109 MG/DL (ref 74–99)
HCT VFR BLD AUTO: 45.5 % (ref 41–52)
HGB BLD-MCNC: 15.1 G/DL (ref 13.5–17.5)
IMM GRANULOCYTES # BLD AUTO: 0.02 X10*3/UL (ref 0–0.5)
IMM GRANULOCYTES NFR BLD AUTO: 0.2 % (ref 0–0.9)
INR PPP: 1 (ref 0.9–1.1)
LYMPHOCYTES # BLD AUTO: 2.87 X10*3/UL (ref 0.8–3)
LYMPHOCYTES NFR BLD AUTO: 31.2 %
MCH RBC QN AUTO: 32.5 PG (ref 26–34)
MCHC RBC AUTO-ENTMCNC: 33.2 G/DL (ref 32–36)
MCV RBC AUTO: 98 FL (ref 80–100)
MONOCYTES # BLD AUTO: 0.76 X10*3/UL (ref 0.05–0.8)
MONOCYTES NFR BLD AUTO: 8.3 %
NEUTROPHILS # BLD AUTO: 5.02 X10*3/UL (ref 1.6–5.5)
NEUTROPHILS NFR BLD AUTO: 54.7 %
NRBC BLD-RTO: 0 /100 WBCS (ref 0–0)
PLATELET # BLD AUTO: 240 X10*3/UL (ref 150–450)
POC APPEARANCE, URINE: CLEAR
POC BILIRUBIN, URINE: NEGATIVE
POC BLOOD, URINE: NEGATIVE
POC COLOR, URINE: YELLOW
POC GLUCOSE, URINE: NEGATIVE MG/DL
POC KETONES, URINE: NEGATIVE MG/DL
POC LEUKOCYTES, URINE: NEGATIVE
POC NITRITE,URINE: NEGATIVE
POC PH, URINE: 5.5 PH
POC PROTEIN, URINE: NEGATIVE MG/DL
POC SPECIFIC GRAVITY, URINE: 1.02
POC UROBILINOGEN, URINE: 1 EU/DL
POTASSIUM SERPL-SCNC: 4.2 MMOL/L (ref 3.5–5.3)
PROT SERPL-MCNC: 6.9 G/DL (ref 6.4–8.2)
PROTHROMBIN TIME: 11.6 SECONDS (ref 9.8–12.8)
RBC # BLD AUTO: 4.64 X10*6/UL (ref 4.5–5.9)
SODIUM SERPL-SCNC: 138 MMOL/L (ref 136–145)
WBC # BLD AUTO: 9.2 X10*3/UL (ref 4.4–11.3)

## 2024-05-20 PROCEDURE — 85025 COMPLETE CBC W/AUTO DIFF WBC: CPT

## 2024-05-20 PROCEDURE — 3074F SYST BP LT 130 MM HG: CPT | Performed by: STUDENT IN AN ORGANIZED HEALTH CARE EDUCATION/TRAINING PROGRAM

## 2024-05-20 PROCEDURE — 1160F RVW MEDS BY RX/DR IN RCRD: CPT | Performed by: STUDENT IN AN ORGANIZED HEALTH CARE EDUCATION/TRAINING PROGRAM

## 2024-05-20 PROCEDURE — 99213 OFFICE O/P EST LOW 20 MIN: CPT | Performed by: STUDENT IN AN ORGANIZED HEALTH CARE EDUCATION/TRAINING PROGRAM

## 2024-05-20 PROCEDURE — 85610 PROTHROMBIN TIME: CPT

## 2024-05-20 PROCEDURE — 1159F MED LIST DOCD IN RCRD: CPT | Performed by: STUDENT IN AN ORGANIZED HEALTH CARE EDUCATION/TRAINING PROGRAM

## 2024-05-20 PROCEDURE — 81003 URINALYSIS AUTO W/O SCOPE: CPT | Performed by: STUDENT IN AN ORGANIZED HEALTH CARE EDUCATION/TRAINING PROGRAM

## 2024-05-20 PROCEDURE — 80048 BASIC METABOLIC PNL TOTAL CA: CPT

## 2024-05-20 PROCEDURE — 80053 COMPREHEN METABOLIC PANEL: CPT

## 2024-05-20 PROCEDURE — 1123F ACP DISCUSS/DSCN MKR DOCD: CPT | Performed by: STUDENT IN AN ORGANIZED HEALTH CARE EDUCATION/TRAINING PROGRAM

## 2024-05-20 PROCEDURE — 3078F DIAST BP <80 MM HG: CPT | Performed by: STUDENT IN AN ORGANIZED HEALTH CARE EDUCATION/TRAINING PROGRAM

## 2024-05-20 PROCEDURE — 36415 COLL VENOUS BLD VENIPUNCTURE: CPT

## 2024-05-20 PROCEDURE — 84153 ASSAY OF PSA TOTAL: CPT

## 2024-05-20 ASSESSMENT — ENCOUNTER SYMPTOMS
FREQUENCY: 0
FLANK PAIN: 0
HEMATURIA: 1
NAUSEA: 0
ABDOMINAL PAIN: 0

## 2024-05-20 ASSESSMENT — PATIENT HEALTH QUESTIONNAIRE - PHQ9
SUM OF ALL RESPONSES TO PHQ9 QUESTIONS 1 AND 2: 0
2. FEELING DOWN, DEPRESSED OR HOPELESS: NOT AT ALL
1. LITTLE INTEREST OR PLEASURE IN DOING THINGS: NOT AT ALL

## 2024-05-20 NOTE — PROGRESS NOTES
Subjective   Patient ID: Cl Feliz is a 78 y.o. male who presents for Blood in Urine (Pt is here for blood in his urine. It was significant and only happened on Wednesday.).  Pt states he had michelle blood in his urine     Right back pain   Different feeling   65 years ago small amount of smoking less than 100 cig       Blood in Urine  This is a new problem. The current episode started in the past 7 days. He describes his urine color as dark red. Irritative symptoms do not include frequency, nocturia or urgency. Pertinent negatives include no abdominal pain, bladder pain, flank pain, genital pain, inability to urinate, nausea or urinary retention. There is no history of recent infection or STDs.       Review of Systems   Gastrointestinal:  Negative for abdominal pain and nausea.   Genitourinary:  Positive for hematuria. Negative for flank pain, frequency, nocturia and urgency.       Objective   Physical Exam  Vitals reviewed.   Constitutional:       Appearance: Normal appearance.   Cardiovascular:      Rate and Rhythm: Normal rate and regular rhythm.      Heart sounds: No murmur heard.  Pulmonary:      Effort: Pulmonary effort is normal. No respiratory distress.      Breath sounds: Normal breath sounds. No wheezing.   Abdominal:      Tenderness: There is no right CVA tenderness or left CVA tenderness.   Musculoskeletal:        Arms:       Cervical back: Neck supple.      Left lower leg: No edema.      Comments: Right lower illiac crest pain with palpation    Neurological:      Mental Status: He is alert.         Assessment/Plan   Diagnoses and all orders for this visit:  Hematuria, unspecified type  Comments:  UA was clear today in office but referral to urology  Orders:  -     POCT UA Automated manually resulted  -     Prostate Spec.Ag,Screen; Future  -     Referral to Urology; Future  -     Protime-INR; Future  -     CBC and Auto Differential; Future  -     Comprehensive metabolic panel; Future           An DAIGLE  DO Brock 05/30/24 10:33 PM

## 2024-05-21 ENCOUNTER — OFFICE VISIT (OUTPATIENT)
Dept: OTOLARYNGOLOGY | Facility: CLINIC | Age: 79
End: 2024-05-21
Payer: MEDICARE

## 2024-05-21 ENCOUNTER — OFFICE VISIT (OUTPATIENT)
Dept: UROLOGY | Facility: CLINIC | Age: 79
End: 2024-05-21
Payer: MEDICARE

## 2024-05-21 VITALS — TEMPERATURE: 97 F | BODY MASS INDEX: 31.86 KG/M2 | HEIGHT: 71 IN

## 2024-05-21 VITALS — WEIGHT: 228 LBS | HEIGHT: 71 IN | BODY MASS INDEX: 31.92 KG/M2

## 2024-05-21 DIAGNOSIS — Z79.2 NEED FOR PROPHYLACTIC ANTIBIOTIC: Primary | ICD-10-CM

## 2024-05-21 DIAGNOSIS — H61.23 BILATERAL IMPACTED CERUMEN: ICD-10-CM

## 2024-05-21 DIAGNOSIS — H60.543 ECZEMA OF EXTERNAL EAR, BILATERAL: Primary | ICD-10-CM

## 2024-05-21 DIAGNOSIS — R31.9 HEMATURIA, UNSPECIFIED TYPE: Primary | ICD-10-CM

## 2024-05-21 LAB
ANION GAP SERPL CALC-SCNC: 15 MMOL/L (ref 10–20)
BUN SERPL-MCNC: 25 MG/DL (ref 6–23)
CALCIUM SERPL-MCNC: 9.7 MG/DL (ref 8.6–10.6)
CHLORIDE SERPL-SCNC: 103 MMOL/L (ref 98–107)
CO2 SERPL-SCNC: 23 MMOL/L (ref 21–32)
CREAT SERPL-MCNC: 1.06 MG/DL (ref 0.5–1.3)
EGFRCR SERPLBLD CKD-EPI 2021: 71 ML/MIN/1.73M*2
GLUCOSE SERPL-MCNC: 107 MG/DL (ref 74–99)
POC APPEARANCE, URINE: CLEAR
POC BILIRUBIN, URINE: NEGATIVE
POC BLOOD, URINE: ABNORMAL
POC COLOR, URINE: YELLOW
POC GLUCOSE, URINE: NEGATIVE MG/DL
POC KETONES, URINE: NEGATIVE MG/DL
POC LEUKOCYTES, URINE: NEGATIVE
POC NITRITE,URINE: NEGATIVE
POC PH, URINE: 5.5 PH
POC PROTEIN, URINE: NEGATIVE MG/DL
POC SPECIFIC GRAVITY, URINE: >=1.03
POC UROBILINOGEN, URINE: 0.2 EU/DL
POTASSIUM SERPL-SCNC: 4.2 MMOL/L (ref 3.5–5.3)
PSA SERPL-MCNC: 3.07 NG/ML
SODIUM SERPL-SCNC: 137 MMOL/L (ref 136–145)

## 2024-05-21 PROCEDURE — 1123F ACP DISCUSS/DSCN MKR DOCD: CPT | Performed by: UROLOGY

## 2024-05-21 PROCEDURE — 51798 US URINE CAPACITY MEASURE: CPT | Performed by: UROLOGY

## 2024-05-21 PROCEDURE — 99204 OFFICE O/P NEW MOD 45 MIN: CPT | Performed by: UROLOGY

## 2024-05-21 PROCEDURE — 88112 CYTOPATH CELL ENHANCE TECH: CPT | Performed by: PATHOLOGY

## 2024-05-21 PROCEDURE — 1159F MED LIST DOCD IN RCRD: CPT | Performed by: UROLOGY

## 2024-05-21 PROCEDURE — 81002 URINALYSIS NONAUTO W/O SCOPE: CPT | Performed by: UROLOGY

## 2024-05-21 PROCEDURE — 87086 URINE CULTURE/COLONY COUNT: CPT

## 2024-05-21 PROCEDURE — 99213 OFFICE O/P EST LOW 20 MIN: CPT | Performed by: OTOLARYNGOLOGY

## 2024-05-21 PROCEDURE — 1126F AMNT PAIN NOTED NONE PRSNT: CPT | Performed by: UROLOGY

## 2024-05-21 PROCEDURE — 88112 CYTOPATH CELL ENHANCE TECH: CPT

## 2024-05-21 PROCEDURE — 1159F MED LIST DOCD IN RCRD: CPT | Performed by: OTOLARYNGOLOGY

## 2024-05-21 PROCEDURE — 1036F TOBACCO NON-USER: CPT | Performed by: OTOLARYNGOLOGY

## 2024-05-21 PROCEDURE — 1160F RVW MEDS BY RX/DR IN RCRD: CPT | Performed by: UROLOGY

## 2024-05-21 PROCEDURE — 1123F ACP DISCUSS/DSCN MKR DOCD: CPT | Performed by: OTOLARYNGOLOGY

## 2024-05-21 PROCEDURE — 69210 REMOVE IMPACTED EAR WAX UNI: CPT | Performed by: OTOLARYNGOLOGY

## 2024-05-21 PROCEDURE — 1036F TOBACCO NON-USER: CPT | Performed by: UROLOGY

## 2024-05-21 PROCEDURE — 1160F RVW MEDS BY RX/DR IN RCRD: CPT | Performed by: OTOLARYNGOLOGY

## 2024-05-21 RX ORDER — TAMSULOSIN HYDROCHLORIDE 0.4 MG/1
0.4 CAPSULE ORAL DAILY
Qty: 30 CAPSULE | Refills: 2 | Status: SHIPPED | OUTPATIENT
Start: 2024-05-21 | End: 2024-08-19

## 2024-05-21 RX ORDER — FLUOCINOLONE ACETONIDE 0.11 MG/ML
OIL AURICULAR (OTIC)
Start: 2024-05-21 | End: 2024-06-06 | Stop reason: SDUPTHER

## 2024-05-21 ASSESSMENT — PAIN SCALES - GENERAL: PAINLEVEL: 0-NO PAIN

## 2024-05-21 NOTE — PROGRESS NOTES
Subjective   Cl Feliz is a 78 y.o. male presenting as a new patient today, kindly referred by Dr. Gutiérrez due to gross hematuria. Patient reports an episode of painless gross hematuria, which has now resolved. He has complaints of bothersome LUTS, mostly bothered by nocturia x2 and significant urinary hesitancy. He has no history of renal stones. Patient is a non-smoker. Denies fevers, chills, urinary retention, intractable flank or abdominal pain, nausea or vomiting.      Past Medical History:   Diagnosis Date    Personal history of other endocrine, nutritional and metabolic disease 01/28/2016    History of hypoglycemia    Supraventricular tachycardia (CMS-HCC) 06/20/2023     Past Surgical History:   Procedure Laterality Date    OTHER SURGICAL HISTORY  07/29/2019    Neck surgery    VASECTOMY  01/28/2016    Surgery Vas Deferens Vasectomy     Family History   Problem Relation Name Age of Onset    Breast cancer Mother      Accidental death Father      No Known Problems Sister      Hypertension Brother       Current Outpatient Medications   Medication Sig Dispense Refill    albuterol 90 mcg/actuation inhaler Inhale 1 puff every 4 hours if needed.      allopurinol (Zyloprim) 300 mg tablet Take 1 tablet (300 mg) by mouth once daily. 90 tablet 3    aspirin 81 mg EC tablet Take 1 tablet (81 mg) by mouth once daily.      atorvastatin (Lipitor) 40 mg tablet Take 1 tablet (40 mg) by mouth once daily.      calcium polycarbophil (FIBERCON ORAL) Take 2 tablets by mouth once daily at bedtime.      cholecalciferol (Vitamin D-3) 25 MCG (1000 UT) capsule Take 2 capsules (50 mcg) by mouth once daily.      coenzyme Q-10 100 mg capsule Take by mouth.      cyclobenzaprine (Flexeril) 10 mg tablet Take 1 tablet (10 mg) by mouth 2 times a day as needed for muscle spasms. 30 tablet 0    fluoride, sodium, (Prevident 5000 Booster) 1.1 % dental paste BRUSH TWICE A DAY. NO EATING / RINSING / DRINKING FOR 30 MINUTES AFTER      lisinopril 20 mg  tablet take one tablet by mouth every day 90 tablet 1    meloxicam (Mobic) 15 mg tablet TAKE ONE TABLET BY MOUTH EVERY DAY AS NEEDED 90 tablet 0    multivit-min/folic acid/vit K1 (MULTI FOR HIM, NO IRON, ORAL) Take by mouth.      omeprazole (PriLOSEC) 20 mg DR capsule Take 1 capsule (20 mg) by mouth once daily.      sildenafil (Viagra) 50 mg tablet TAKE 1 TABLET BY MOUTH EVERY DAY AS NEEDED APPROXIMATELY 1 HOUR BEFORE SEXUAL ACTIVITY      triamcinolone (Kenalog) 0.1 % ointment apply twice daily to affected areas for no more than 2 weeks per month.      fluocinolone (DermOtic) 0.01 % ear drops Instill 5 drops in the affected ear once a day as needed for itching (Patient not taking: Reported on 5/20/2024) 20 mL 3     No current facility-administered medications for this visit.     Allergies   Allergen Reactions    Penicillin G Unknown    Penicillins Unknown     Social History     Socioeconomic History    Marital status:      Spouse name: Not on file    Number of children: 2    Years of education: Not on file    Highest education level: Not on file   Occupational History    Occupation: Retired   Tobacco Use    Smoking status: Never    Smokeless tobacco: Never   Vaping Use    Vaping status: Never Used   Substance and Sexual Activity    Alcohol use: Yes     Comment: once a month    Drug use: Not Currently    Sexual activity: Defer   Other Topics Concern    Not on file   Social History Narrative    Not on file     Social Determinants of Health     Financial Resource Strain: Not on file   Food Insecurity: Not on file   Transportation Needs: Not on file   Physical Activity: Not on file   Stress: Not on file   Social Connections: Not on file   Intimate Partner Violence: Not on file   Housing Stability: Not on file       Review of Systems  Pertinent items are noted in HPI.    Objective       Lab Review  Lab Results   Component Value Date    WBC 9.2 05/20/2024    RBC 4.64 05/20/2024    HGB 15.1 05/20/2024    HCT 45.5  05/20/2024     05/20/2024      Lab Results   Component Value Date    BUN 25 (H) 05/20/2024    CREATININE 1.12 05/20/2024      Pvr=40 ml  Urine analysis shows +blood     Assessment/Plan   Diagnoses and all orders for this visit:  Hematuria, unspecified type  -     Referral to Urology  -     POCT UA (nonautomated) manually resulted  -     Measure post void residual  -     CT urography w 3D volume rendered imaging; Future  -     Cytology Consultation (Non-Gynecologic); Future  -     Urine Culture; Future  -     Basic metabolic panel; Future    Hematuria     We will plan for diagnostic hematuria workup which includes cystoscopy, urine culture, urine cytology, and CT urogram. Patient is aware of the risks associated with intravenous contrast. There is no history of renal dysfunction. The risks of cystoscopy were discussed with the patient in great detail, including risk of hematuria, UTI and discomfort. Antibiotic will be prescribed to be taken 1 hour prior to the procedure. Patient agrees to proceed. Will schedule in a timely manner.    LUTS - nocturia x2 and urinary hesitancy       We will initiate Tamsulosin 0.4 mg daily. Side effects of the medication were discussed with the patient including dizziness, drowsiness, weakness, nausea, diarrhea, headache, retrograde ejaculation, back pain, and runny nose. Patient understands risks and wishes to proceed.     All questions were answered to the patient's satisfaction. Patient agrees with the plan and wishes to proceed. Follow-up will be scheduled appropriately.     E&M visit today is associated with current or anticipated ongoing medical care services related to a patient's single, serious condition or a complex condition.    Scribed for Dr. Balbuena by Zulma Heredia. I , Dr Balbuena, have personally reviewed and agreed with the information entered by the Virtual Scribe.

## 2024-05-21 NOTE — PROGRESS NOTES
Subjective   Patient ID: Cl Feliz is a 78 y.o. 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  He is complaining of congestion in his ears again.  Review of Systems    Objective   Physical Exam  There is cerumen impaction bilaterally and this was cleared using speculum and curettes.  There is dry squamous debris in the ear canals consistent with eczema and this was also debrided.  The tympanic membranes are clear and mobile.    Ear cerumen removal    Date/Time: 5/21/2024 11:54 AM    Performed by: Natalie Page MD  Authorized by: Natalie Page MD    Consent:     Consent obtained:  Verbal    Risks discussed:  Pain  Procedure details:     Location:  L ear and R ear    Procedure type: curette        Assessment/Plan   Diagnoses and all orders for this visit:  Eczema of external ear, bilateral (Primary)  -     fluocinolone (DermOtic) 0.01 % ear drops; Instill 5 drops in the affected ear once a day as needed for itching  Bilateral impacted cerumen  Other orders  -     Ear cerumen removal     Eczema of the external ear canals and recurrent cerumen impaction which was cleaned today. He was advised to continue the Dermotic drops as needed and he will follow-up in 6 months. His prescription was renewed today.

## 2024-05-22 LAB — BACTERIA UR CULT: NO GROWTH

## 2024-05-24 LAB
LABORATORY COMMENT REPORT: NORMAL
LABORATORY COMMENT REPORT: NORMAL
PATH REPORT.FINAL DX SPEC: NORMAL
PATH REPORT.GROSS SPEC: NORMAL
PATH REPORT.RELEVANT HX SPEC: NORMAL
PATH REPORT.TOTAL CANCER: NORMAL

## 2024-05-28 RX ORDER — CIPROFLOXACIN 500 MG/1
500 TABLET ORAL ONCE
Qty: 1 TABLET | Refills: 0 | Status: SHIPPED | OUTPATIENT
Start: 2024-05-28 | End: 2024-05-28

## 2024-05-31 ENCOUNTER — HOSPITAL ENCOUNTER (OUTPATIENT)
Dept: RADIOLOGY | Facility: CLINIC | Age: 79
Discharge: HOME | End: 2024-05-31
Payer: MEDICARE

## 2024-05-31 DIAGNOSIS — R31.9 HEMATURIA, UNSPECIFIED TYPE: ICD-10-CM

## 2024-05-31 PROCEDURE — 74178 CT ABD&PLV WO CNTR FLWD CNTR: CPT | Performed by: STUDENT IN AN ORGANIZED HEALTH CARE EDUCATION/TRAINING PROGRAM

## 2024-05-31 PROCEDURE — 76377 3D RENDER W/INTRP POSTPROCES: CPT

## 2024-05-31 PROCEDURE — 76377 3D RENDER W/INTRP POSTPROCES: CPT | Performed by: STUDENT IN AN ORGANIZED HEALTH CARE EDUCATION/TRAINING PROGRAM

## 2024-05-31 PROCEDURE — 2550000001 HC RX 255 CONTRASTS: Performed by: UROLOGY

## 2024-05-31 RX ADMIN — IOHEXOL 75 ML: 350 INJECTION, SOLUTION INTRAVENOUS at 10:05

## 2024-06-06 ENCOUNTER — OFFICE VISIT (OUTPATIENT)
Dept: CARDIOLOGY | Facility: CLINIC | Age: 79
End: 2024-06-06
Payer: MEDICARE

## 2024-06-06 VITALS
HEART RATE: 78 BPM | OXYGEN SATURATION: 94 % | BODY MASS INDEX: 31.78 KG/M2 | SYSTOLIC BLOOD PRESSURE: 134 MMHG | WEIGHT: 227 LBS | DIASTOLIC BLOOD PRESSURE: 83 MMHG | HEIGHT: 71 IN

## 2024-06-06 DIAGNOSIS — I73.9 PVD (PERIPHERAL VASCULAR DISEASE) (CMS-HCC): Primary | ICD-10-CM

## 2024-06-06 PROCEDURE — 99214 OFFICE O/P EST MOD 30 MIN: CPT | Performed by: INTERNAL MEDICINE

## 2024-06-06 PROCEDURE — 3075F SYST BP GE 130 - 139MM HG: CPT | Performed by: INTERNAL MEDICINE

## 2024-06-06 PROCEDURE — 3079F DIAST BP 80-89 MM HG: CPT | Performed by: INTERNAL MEDICINE

## 2024-06-06 PROCEDURE — 1123F ACP DISCUSS/DSCN MKR DOCD: CPT | Performed by: INTERNAL MEDICINE

## 2024-06-06 ASSESSMENT — ENCOUNTER SYMPTOMS
OCCASIONAL FEELINGS OF UNSTEADINESS: 0
DEPRESSION: 0
LOSS OF SENSATION IN FEET: 0

## 2024-06-06 NOTE — PROGRESS NOTES
Primary Care Physician: An Gutiérrez DO  Date of Visit: 06/06/2024  2:00 PM EDT  Location of visit: [address]     Chief Complaint:   Chief Complaint   Patient presents with    Follow-up     HPI / Summary:   Cl Feliz is a 78 y.o. male presents for follow-up    ROS    Medical History:   He has a past medical history of Personal history of other endocrine, nutritional and metabolic disease (01/28/2016) and Supraventricular tachycardia (CMS-HCC) (06/20/2023).  Surgical Hx:   He has a past surgical history that includes Vasectomy (01/28/2016) and Other surgical history (07/29/2019).   Social Hx:   He reports that he has never smoked. He has never used smokeless tobacco. He reports current alcohol use. He reports that he does not currently use drugs.  Family Hx:   His family history includes Accidental death in his father; Breast cancer in his mother; Hypertension in his brother; No Known Problems in his sister.   Allergies:  Allergies   Allergen Reactions    Penicillin G Unknown    Penicillins Unknown     Outpatient Medications:  Current Outpatient Medications   Medication Instructions    albuterol 90 mcg/actuation inhaler 1 puff, inhalation, Every 4 hours PRN    allopurinol (ZYLOPRIM) 300 mg, oral, Daily    aspirin 81 mg, oral, Daily    atorvastatin (LIPITOR) 40 mg, oral, Daily    calcium polycarbophil (FIBERCON ORAL) 2 tablets, oral, Nightly    cholecalciferol (VITAMIN D-3) 2,000 Units, oral, Daily    coenzyme Q-10 100 mg capsule oral    cyclobenzaprine (FLEXERIL) 10 mg, oral, 2 times daily PRN    fluocinolone (DermOtic) 0.01 % ear drops Instill 5 drops in the affected ear once a day as needed for itching    fluoride, sodium, (Prevident 5000 Booster) 1.1 % dental paste BRUSH TWICE A DAY. NO EATING / RINSING / DRINKING FOR 30 MINUTES AFTER    lisinopril 20 mg, oral, Daily    meloxicam (MOBIC) 15 mg, oral, Daily PRN    multivit-min/folic acid/vit K1 (MULTI FOR HIM, NO IRON, ORAL) oral    omeprazole (PRILOSEC)  "20 mg, oral, Daily RT    sildenafil (Viagra) 50 mg tablet TAKE 1 TABLET BY MOUTH EVERY DAY AS NEEDED APPROXIMATELY 1 HOUR BEFORE SEXUAL ACTIVITY    tamsulosin (FLOMAX) 0.4 mg, oral, Daily    triamcinolone (Kenalog) 0.1 % ointment apply twice daily to affected areas for no more than 2 weeks per month.     Physical Exam:  Vitals:    06/06/24 1309   BP: 134/83   BP Location: Left arm   Patient Position: Sitting   Pulse: 78   SpO2: 94%   Weight: 103 kg (227 lb)   Height: 1.803 m (5' 11\")     Wt Readings from Last 5 Encounters:   06/06/24 103 kg (227 lb)   05/21/24 103 kg (228 lb)   05/20/24 104 kg (228 lb 6.4 oz)   04/15/24 98.9 kg (218 lb)   03/25/24 105 kg (230 lb 12.8 oz)     Physical Exam  JVP not elevated. Carotid impulses are 2+ without overlying bruit.   Chest exhibits fair to good air movement with completely clear breath sounds.   The cardiac rhythm is regular with no premature beats.   Normal S1 and S2. No gallop, murmur or rub, or click.   Abdomen is soft and benign without focal tenderness.   With no lower leg edema. The pedal pulses are intact.     Last Labs:  Office Visit on 05/21/2024   Component Date Value    POC Color, Urine 05/21/2024 Yellow     POC Appearance, Urine 05/21/2024 Clear     POC Glucose, Urine 05/21/2024 NEGATIVE     POC Bilirubin, Urine 05/21/2024 NEGATIVE     POC Ketones, Urine 05/21/2024 NEGATIVE     POC Specific Gravity, Ur* 05/21/2024 >=1.030     POC Blood, Urine 05/21/2024 TRACE-Intact (A)     POC PH, Urine 05/21/2024 5.5     POC Protein, Urine 05/21/2024 NEGATIVE     POC Urobilinogen, Urine 05/21/2024 0.2     Poc Nitrite, Urine 05/21/2024 NEGATIVE     POC Leukocytes, Urine 05/21/2024 NEGATIVE     Case Report 05/21/2024                      Value:Non-gynecologic Cytology                          Case: [id number]                                   Authorizing Provider:  Millicent Balbuena MD           Collected:           05/21/2024 1321              Ordering Location:     [facility]       Received:            05/21/2024 1321              Pathologist:           Jacinta Vidal MD                                                           Specimen:    URINE VOIDED                                                                               Final Cytological Interp* 05/21/2024                      Value:This result contains rich text formatting which cannot be displayed here.      05/21/2024                      Value:This result contains rich text formatting which cannot be displayed here.    Clinical History 05/21/2024                      Value:This result contains rich text formatting which cannot be displayed here.    Specimen Description 05/21/2024                      Value:This result contains rich text formatting which cannot be displayed here.    Specimen Processing Deta* 05/21/2024                      Value:This result contains rich text formatting which cannot be displayed here.    Urine Culture 05/21/2024 No growth    Lab on 05/20/2024   Component Date Value    Prostate Specific Antige* 05/20/2024 3.07     Protime 05/20/2024 11.6     INR 05/20/2024 1.0     WBC 05/20/2024 9.2     nRBC 05/20/2024 0.0     RBC 05/20/2024 4.64     Hemoglobin 05/20/2024 15.1     Hematocrit 05/20/2024 45.5     MCV 05/20/2024 98     MCH 05/20/2024 32.5     MCHC 05/20/2024 33.2     RDW 05/20/2024 13.2     Platelets 05/20/2024 240     Neutrophils % 05/20/2024 54.7     Immature Granulocytes %,* 05/20/2024 0.2     Lymphocytes % 05/20/2024 31.2     Monocytes % 05/20/2024 8.3     Eosinophils % 05/20/2024 5.3     Basophils % 05/20/2024 0.3     Neutrophils Absolute 05/20/2024 5.02     Immature Granulocytes Ab* 05/20/2024 0.02     Lymphocytes Absolute 05/20/2024 2.87     Monocytes Absolute 05/20/2024 0.76     Eosinophils Absolute 05/20/2024 0.49 (H)     Basophils Absolute 05/20/2024 0.03     Glucose 05/20/2024 109 (H)     Sodium 05/20/2024 138     Potassium 05/20/2024 4.2     Chloride 05/20/2024 103      Bicarbonate 05/20/2024 24     Anion Gap 05/20/2024 15     Urea Nitrogen 05/20/2024 25 (H)     Creatinine 05/20/2024 1.12     eGFR 05/20/2024 67     Calcium 05/20/2024 9.8     Albumin 05/20/2024 4.4     Alkaline Phosphatase 05/20/2024 58     Total Protein 05/20/2024 6.9     AST 05/20/2024 18     Bilirubin, Total 05/20/2024 0.8     ALT 05/20/2024 29     Glucose 05/20/2024 107 (H)     Sodium 05/20/2024 137     Potassium 05/20/2024 4.2     Chloride 05/20/2024 103     Bicarbonate 05/20/2024 23     Anion Gap 05/20/2024 15     Urea Nitrogen 05/20/2024 25 (H)     Creatinine 05/20/2024 1.06     eGFR 05/20/2024 71     Calcium 05/20/2024 9.7    Office Visit on 05/20/2024   Component Date Value    POC Color, Urine 05/20/2024 Yellow     POC Appearance, Urine 05/20/2024 Clear     POC Glucose, Urine 05/20/2024 NEGATIVE     POC Bilirubin, Urine 05/20/2024 NEGATIVE     POC Ketones, Urine 05/20/2024 NEGATIVE     POC Specific Gravity, Ur* 05/20/2024 1.025     POC Blood, Urine 05/20/2024 NEGATIVE     POC PH, Urine 05/20/2024 5.5     POC Protein, Urine 05/20/2024 NEGATIVE     POC Urobilinogen, Urine 05/20/2024 1.0     Poc Nitrite, Urine 05/20/2024 NEGATIVE     POC Leukocytes, Urine 05/20/2024 NEGATIVE    Lab on 12/19/2023   Component Date Value    WBC 12/19/2023 11.4 (H)     nRBC 12/19/2023 0.0     RBC 12/19/2023 4.94     Hemoglobin 12/19/2023 15.7     Hematocrit 12/19/2023 47.8     MCV 12/19/2023 97     MCH 12/19/2023 31.8     MCHC 12/19/2023 32.8     RDW 12/19/2023 13.1     Platelets 12/19/2023 236     Neutrophils % 12/19/2023 53.8     Immature Granulocytes %,* 12/19/2023 0.3     Lymphocytes % 12/19/2023 31.5     Monocytes % 12/19/2023 7.4     Eosinophils % 12/19/2023 6.6     Basophils % 12/19/2023 0.4     Neutrophils Absolute 12/19/2023 6.14 (H)     Immature Granulocytes Ab* 12/19/2023 0.03     Lymphocytes Absolute 12/19/2023 3.59 (H)     Monocytes Absolute 12/19/2023 0.85 (H)     Eosinophils Absolute 12/19/2023 0.75 (H)      Basophils Absolute 12/19/2023 0.05     Glucose 12/19/2023 98     Sodium 12/19/2023 140     Potassium 12/19/2023 4.1     Chloride 12/19/2023 102     Bicarbonate 12/19/2023 28     Anion Gap 12/19/2023 14     Urea Nitrogen 12/19/2023 27 (H)     Creatinine 12/19/2023 1.08     eGFR 12/19/2023 70     Calcium 12/19/2023 9.9     Albumin 12/19/2023 4.4     Alkaline Phosphatase 12/19/2023 52     Total Protein 12/19/2023 6.9     AST 12/19/2023 16     Bilirubin, Total 12/19/2023 0.7     ALT 12/19/2023 29         Assessment/Plan           Or1. Chest pain. Patient presented to [facility] July 7, 2017 with complaints of chest pain. He states he has had two cardiac catheters in the past, the last one about 15 years prior which he was told were normal. At that time he underwent a stress echo which was negative for ischemia. He also had an echocardiogram which showed mild LVH, EF of 65-69%, and mild aortic valve sclerosis.      2. Hypertension.  Blood pressure appears to be well within normal range.  Will continue on lisinopril 20 mg daily.     3. Hyperlipidemia. Fasting lipid panel 12/2021 showed a cholesterol 131, HDL 42, LDL 70, triglyceride 95.  The patient's most recent labs from 5/26/2023 include cholesterol 137 LDL 71 HDL 39 triglyceride 130.  The CBC and comprehensive metabolic panels were normal.  Glycohemoglobin 5.6% TSH 1.95 PSA 2.33.  Patient will remain on atorvastatin 40 mg daily.  Recheck labs after next visit.     4. Obstructive sleep apnea.  Patient has had a longstanding history of confirmed obstructive sleep apnea.  He falls asleep at times inappropriately In Charge watching television.  He has good and bad nights for sleep.  He has had a CPAP mask for many years but it has not been evaluated or changed in a number of years.  He will be referred to the sleep medicine nurse practitioner for reevaluation.     5. Neck and spine surgery. After being ruled out for cardiac etiology with [facility] visit July  2017, patient saw neurology, Dr Manuel. He had neck surgery 02/2018 with Dr Jaramillo and apparently spine surgery may follow as he is still having low back pain.      6. Adenoma left adrenal. Patient had a CT of the chest and 7/2017 which showed a small, minute adenoma of the left adrenal.     7. Carotid US done 09/2017 showed less than 50% stenosis bilaterally.     8. PVRs done 09/2017 were negative.     9. Hx of vagal response. Wore Zio patch monitor 03/2022 that showed heart rate of 29-1 39 with an average of 84 bpm. Sinus rhythm. 2 SVT runs. Second-degree AV block Mobitz 1 was rarely present. Rare ectopy was noted. Patient triggered response for ectopy. Patient wears life alert necklace. Will notify office for any further presyncopal or syncopal events.       ders:  No orders of the defined types were placed in this encounter.     Followup Appts:  Future Appointments   Date Time Provider Department Center   6/6/2024  2:00 PM Milton Betancourt MD [id number] [facility]   6/11/2024  1:00 PM Millicent Balbuena MD [id number] [facility]   6/19/2024 11:00 AM An Gutiérrez DO [id number] [facility]   11/21/2024 10:00 AM Natalie Page MD [id number] [facility]   4/14/2025  2:00 PM Otoniel Pino PA-C [id number] [facility]           ____________________________________________________________  Milton Betancourt MD  [facility]  Assistant Clinical Professor, [employer]  [facility]

## 2024-06-11 ENCOUNTER — PROCEDURE VISIT (OUTPATIENT)
Dept: UROLOGY | Facility: CLINIC | Age: 79
End: 2024-06-11
Payer: MEDICARE

## 2024-06-11 DIAGNOSIS — Z01.818 PREOP TESTING: ICD-10-CM

## 2024-06-11 DIAGNOSIS — N40.1 BPH WITH OBSTRUCTION/LOWER URINARY TRACT SYMPTOMS: ICD-10-CM

## 2024-06-11 DIAGNOSIS — R31.9 HEMATURIA, UNSPECIFIED TYPE: Primary | ICD-10-CM

## 2024-06-11 DIAGNOSIS — N13.8 BPH WITH OBSTRUCTION/LOWER URINARY TRACT SYMPTOMS: ICD-10-CM

## 2024-06-11 DIAGNOSIS — N21.0 BLADDER STONES: ICD-10-CM

## 2024-06-11 LAB
POC APPEARANCE, URINE: CLEAR
POC BILIRUBIN, URINE: NEGATIVE
POC BLOOD, URINE: ABNORMAL
POC COLOR, URINE: YELLOW
POC GLUCOSE, URINE: NEGATIVE MG/DL
POC KETONES, URINE: NEGATIVE MG/DL
POC LEUKOCYTES, URINE: NEGATIVE
POC NITRITE,URINE: NEGATIVE
POC PH, URINE: 6.5 PH
POC PROTEIN, URINE: NEGATIVE MG/DL
POC SPECIFIC GRAVITY, URINE: 1.01
POC UROBILINOGEN, URINE: 0.2 EU/DL

## 2024-06-11 PROCEDURE — 81003 URINALYSIS AUTO W/O SCOPE: CPT | Performed by: UROLOGY

## 2024-06-11 PROCEDURE — 99215 OFFICE O/P EST HI 40 MIN: CPT | Performed by: UROLOGY

## 2024-06-11 PROCEDURE — 52000 CYSTOURETHROSCOPY: CPT | Performed by: UROLOGY

## 2024-06-11 RX ORDER — CIPROFLOXACIN 2 MG/ML
400 INJECTION, SOLUTION INTRAVENOUS ONCE
OUTPATIENT
Start: 2024-06-11 | End: 2024-06-11

## 2024-06-11 RX ORDER — SODIUM CHLORIDE, SODIUM LACTATE, POTASSIUM CHLORIDE, CALCIUM CHLORIDE 600; 310; 30; 20 MG/100ML; MG/100ML; MG/100ML; MG/100ML
100 INJECTION, SOLUTION INTRAVENOUS CONTINUOUS
OUTPATIENT
Start: 2024-06-11

## 2024-06-19 ENCOUNTER — APPOINTMENT (OUTPATIENT)
Dept: PRIMARY CARE | Facility: CLINIC | Age: 79
End: 2024-06-19
Payer: MEDICARE

## 2024-06-19 VITALS
HEIGHT: 71 IN | RESPIRATION RATE: 17 BRPM | OXYGEN SATURATION: 96 % | HEART RATE: 82 BPM | WEIGHT: 226.4 LBS | BODY MASS INDEX: 31.69 KG/M2 | DIASTOLIC BLOOD PRESSURE: 52 MMHG | SYSTOLIC BLOOD PRESSURE: 126 MMHG

## 2024-06-19 DIAGNOSIS — R31.9 HEMATURIA, UNSPECIFIED TYPE: ICD-10-CM

## 2024-06-19 DIAGNOSIS — E66.9 CLASS 1 OBESITY WITH BODY MASS INDEX (BMI) OF 31.0 TO 31.9 IN ADULT, UNSPECIFIED OBESITY TYPE, UNSPECIFIED WHETHER SERIOUS COMORBIDITY PRESENT: ICD-10-CM

## 2024-06-19 DIAGNOSIS — M48.061 SPINAL STENOSIS OF LUMBAR REGION WITHOUT NEUROGENIC CLAUDICATION: ICD-10-CM

## 2024-06-19 DIAGNOSIS — G47.33 OBSTRUCTIVE SLEEP APNEA SYNDROME: ICD-10-CM

## 2024-06-19 DIAGNOSIS — E78.2 MIXED HYPERLIPIDEMIA: ICD-10-CM

## 2024-06-19 DIAGNOSIS — Z00.00 ROUTINE GENERAL MEDICAL EXAMINATION AT HEALTH CARE FACILITY: Primary | ICD-10-CM

## 2024-06-19 DIAGNOSIS — I10 PRIMARY HYPERTENSION: ICD-10-CM

## 2024-06-19 PROCEDURE — 1123F ACP DISCUSS/DSCN MKR DOCD: CPT | Performed by: STUDENT IN AN ORGANIZED HEALTH CARE EDUCATION/TRAINING PROGRAM

## 2024-06-19 PROCEDURE — 99214 OFFICE O/P EST MOD 30 MIN: CPT | Performed by: STUDENT IN AN ORGANIZED HEALTH CARE EDUCATION/TRAINING PROGRAM

## 2024-06-19 PROCEDURE — 1159F MED LIST DOCD IN RCRD: CPT | Performed by: STUDENT IN AN ORGANIZED HEALTH CARE EDUCATION/TRAINING PROGRAM

## 2024-06-19 PROCEDURE — 1170F FXNL STATUS ASSESSED: CPT | Performed by: STUDENT IN AN ORGANIZED HEALTH CARE EDUCATION/TRAINING PROGRAM

## 2024-06-19 PROCEDURE — 3078F DIAST BP <80 MM HG: CPT | Performed by: STUDENT IN AN ORGANIZED HEALTH CARE EDUCATION/TRAINING PROGRAM

## 2024-06-19 PROCEDURE — 3074F SYST BP LT 130 MM HG: CPT | Performed by: STUDENT IN AN ORGANIZED HEALTH CARE EDUCATION/TRAINING PROGRAM

## 2024-06-19 PROCEDURE — G0439 PPPS, SUBSEQ VISIT: HCPCS | Performed by: STUDENT IN AN ORGANIZED HEALTH CARE EDUCATION/TRAINING PROGRAM

## 2024-06-19 RX ORDER — CEPHALEXIN 500 MG/1
CAPSULE ORAL
COMMUNITY
Start: 2024-06-05

## 2024-06-19 ASSESSMENT — ACTIVITIES OF DAILY LIVING (ADL)
DOING_HOUSEWORK: INDEPENDENT
DRESSING: INDEPENDENT
BATHING: INDEPENDENT
TAKING_MEDICATION: INDEPENDENT
GROCERY_SHOPPING: INDEPENDENT
MANAGING_FINANCES: INDEPENDENT

## 2024-06-19 NOTE — PROGRESS NOTES
"Subjective   Reason for Visit: Cl Feliz is an 78 y.o. male here for a Medicare Wellness visit.     Past Medical, Surgical, and Family History reviewed and updated in chart.         HPI   Interval Hx:   Hematuria was noted at our last visit. He was sent to urology and is currently     S/p cystoscopy   12 mm posterior urinary bladder wall calculus.  3. Bilateral simple renal cysts. No suspicious mass or abnormal  enhancement.  4. 10 mm left adrenal adenomas, stable since 2018.  5. Severe atherosclerotic calcifications.    Planning for for 7/22 surgery   Preopt testing 7/8 preopt testing     Patient Care Team:  An Gutiérrez,  as PCP - General (Family Medicine)  An Gutiérrez, DO as PCP - [employer]vantage PCP  An Gutiérrez DO as PCP - [employer]vantage PCP     Objective   Vitals:  /52   Pulse 82   Resp 17   Ht 1.803 m (5' 11\")   Wt 103 kg (226 lb 6.4 oz)   SpO2 96%   BMI 31.58 kg/m²       Physical Exam  Vitals reviewed.   Constitutional:       General: He is not in acute distress.     Appearance: He is not ill-appearing.   HENT:      Right Ear: Tympanic membrane and ear canal normal.      Left Ear: Tympanic membrane and ear canal normal.      Mouth/Throat:      Mouth: Mucous membranes are moist.      Pharynx: Oropharynx is clear. No oropharyngeal exudate or posterior oropharyngeal erythema.   Eyes:      Extraocular Movements: Extraocular movements intact.      Conjunctiva/sclera: Conjunctivae normal.      Pupils: Pupils are equal, round, and reactive to light.   Neck:      Vascular: No carotid bruit.   Cardiovascular:      Rate and Rhythm: Normal rate and regular rhythm.      Heart sounds: No murmur heard.     No gallop.   Pulmonary:      Effort: Pulmonary effort is normal.      Breath sounds: Normal breath sounds. No wheezing, rhonchi or rales.   Abdominal:      General: Abdomen is flat. Bowel sounds are normal.      Palpations: Abdomen is soft.      Tenderness: There is no abdominal " tenderness.   Musculoskeletal:      Cervical back: Neck supple.      Left lower leg: No edema.   Skin:     General: Skin is warm and dry.      Findings: No rash.   Neurological:      General: No focal deficit present.      Mental Status: He is alert and oriented to person, place, and time.      Gait: Gait abnormal.   Psychiatric:         Mood and Affect: Mood normal.         Behavior: Behavior normal.         Assessment/Plan           Lumbar Radiculopathy  Improved with physical therapy   Continue with therapy at home     HTN  BP was good at 126/52 mmHg  refilled lisinopril 20mg daily.  Physical exam was stable. Discussed maintaining diets such as DASH to help maintain normal Blood pressure. Encouraged regular exercise for a total of 120min weekly. We will fu labs in 1-3 days. For now there will be no change in medical management. All questions and concerns were addressed. Pt will follow up in 4-6months or sooner if needed.  - seeing Cardiology- Dr. Betancourt/Zehra Jeffrey       S/p cystoscopy   12 mm posterior urinary bladder wall calculus.  3. Bilateral simple renal cysts. No suspicious mass or abnormal  enhancement.  4. 10 mm left adrenal adenomas, stable since 2018.  5. Severe atherosclerotic calcifications.  Planning for for 7/22 surgery   Preopt testing 7/8 preopt testing      DSL   continue atorvastatin 40mg daily   followed by cardio     Gout  - on allopurinol 300 mg daily  - no gouty attacks recently     S/P cervical surgery c4-c7 2018/ lumbar radiculopathy  - seeing Ortho- Dr. Jaramillo  - meloxicam 15mg   - continue with physical therapy       PVD  stable     WES   Following with sleep medicine  Bipap tolerance and compliance doing well     RTC in 6 months   Health Maintenance   Topic Date Due    Hepatitis C Screening  Never done    Zoster Vaccines (1 of 2) Never done    RSV Pregnant patients and/or  patients aged 60+ years (1 - 1-dose 60+ series) Never done    Pneumococcal Vaccine: 65+ Years (2 of 2 - PPSV23  or PCV20) 11/12/2021    COVID-19 Vaccine (3 - 2023-24 season) 09/01/2023    Influenza Vaccine (Season Ended) 09/01/2024    Medicare Annual Wellness Visit (AWV)  12/20/2024    DTaP/Tdap/Td Vaccines (2 - Td or Tdap) 10/13/2027    Lipid Panel  05/26/2028    HIB Vaccines  Aged Out    Hepatitis B Vaccines  Aged Out    IPV Vaccines  Aged Out    Hepatitis A Vaccines  Aged Out    Meningococcal Vaccine  Aged Out    Rotavirus Vaccines  Aged Out    HPV Vaccines  Aged Out

## 2024-06-29 NOTE — PROGRESS NOTES
Primary Care Physician: An Gutiérrez DO  Date of Visit: 06/06/2024  2:00 PM EDT  Location of visit: [address]     Chief Complaint:   Chief Complaint   Patient presents with    Follow-up     HPI / Summary:   Cl Feliz is a 78 y.o. male presents for follow-up    ROS    Medical History:   He has a past medical history of Personal history of other endocrine, nutritional and metabolic disease (01/28/2016) and Supraventricular tachycardia (CMS-HCC) (06/20/2023).  Surgical Hx:   He has a past surgical history that includes Vasectomy (01/28/2016) and Other surgical history (07/29/2019).   Social Hx:   He reports that he has never smoked. He has never used smokeless tobacco. He reports current alcohol use. He reports that he does not currently use drugs.  Family Hx:   His family history includes Accidental death in his father; Breast cancer in his mother; Hypertension in his brother; No Known Problems in his sister.   Allergies:  Allergies   Allergen Reactions    Penicillin G Unknown    Penicillins Unknown     Outpatient Medications:  Current Outpatient Medications   Medication Instructions    albuterol 90 mcg/actuation inhaler 1 puff, inhalation, Every 4 hours PRN    allopurinol (ZYLOPRIM) 300 mg, oral, Daily    aspirin 81 mg, oral, Daily    atorvastatin (LIPITOR) 40 mg, oral, Daily    calcium polycarbophil (FIBERCON ORAL) 2 tablets, oral, Nightly    cephalexin (Keflex) 500 mg capsule TAKE ONE CAPSULE BY MOUTH EVERY 6 HOURS UNTIL GONE    cholecalciferol (VITAMIN D-3) 2,000 Units, oral, Daily    coenzyme Q-10 100 mg capsule oral    cyclobenzaprine (FLEXERIL) 10 mg, oral, 2 times daily PRN    fluocinolone (DermOtic) 0.01 % ear drops Instill 5 drops in the affected ear once a day as needed for itching    fluoride, sodium, (Prevident 5000 Booster) 1.1 % dental paste BRUSH TWICE A DAY. NO EATING / RINSING / DRINKING FOR 30 MINUTES AFTER    lisinopril 20 mg, oral, Daily    meloxicam (MOBIC) 15 mg, oral, Daily PRN     "multivit-min/folic acid/vit K1 (MULTI FOR HIM, NO IRON, ORAL) oral    omeprazole (PRILOSEC) 20 mg, oral, Daily RT    sildenafil (Viagra) 50 mg tablet TAKE 1 TABLET BY MOUTH EVERY DAY AS NEEDED APPROXIMATELY 1 HOUR BEFORE SEXUAL ACTIVITY    tamsulosin (FLOMAX) 0.4 mg, oral, Daily    triamcinolone (Kenalog) 0.1 % ointment apply twice daily to affected areas for no more than 2 weeks per month.     Physical Exam:  Vitals:    06/06/24 1309   BP: 134/83   BP Location: Left arm   Patient Position: Sitting   Pulse: 78   SpO2: 94%   Weight: 103 kg (227 lb)   Height: 1.803 m (5' 11\")     Wt Readings from Last 5 Encounters:   06/19/24 103 kg (226 lb 6.4 oz)   06/06/24 103 kg (227 lb)   05/21/24 103 kg (228 lb)   05/20/24 104 kg (228 lb 6.4 oz)   04/15/24 98.9 kg (218 lb)     Physical Exam  JVP not elevated. Carotid impulses are 2+ without overlying bruit.   Chest exhibits fair to good air movement with completely clear breath sounds.   The cardiac rhythm is regular with no premature beats.   Normal S1 and S2. No gallop, murmur or rub, or click.   Abdomen is soft and benign without focal tenderness.   With no lower leg edema. The pedal pulses are intact.     Last Labs:  Office Visit on 05/21/2024   Component Date Value    POC Color, Urine 05/21/2024 Yellow     POC Appearance, Urine 05/21/2024 Clear     POC Glucose, Urine 05/21/2024 NEGATIVE     POC Bilirubin, Urine 05/21/2024 NEGATIVE     POC Ketones, Urine 05/21/2024 NEGATIVE     POC Specific Gravity, Ur* 05/21/2024 >=1.030     POC Blood, Urine 05/21/2024 TRACE-Intact (A)     POC PH, Urine 05/21/2024 5.5     POC Protein, Urine 05/21/2024 NEGATIVE     POC Urobilinogen, Urine 05/21/2024 0.2     Poc Nitrite, Urine 05/21/2024 NEGATIVE     POC Leukocytes, Urine 05/21/2024 NEGATIVE     Case Report 05/21/2024                      Value:Non-gynecologic Cytology                          Case: [id number]                                   Authorizing Provider:  Millicent Balbuena MD           " Collected:           05/21/2024 1321              Ordering Location:     [facility]       Received:            05/21/2024 1321              Pathologist:           Jacinta Vidal MD                                                           Specimen:    URINE VOIDED                                                                               Final Cytological Interp* 05/21/2024                      Value:This result contains rich text formatting which cannot be displayed here.      05/21/2024                      Value:This result contains rich text formatting which cannot be displayed here.    Clinical History 05/21/2024                      Value:This result contains rich text formatting which cannot be displayed here.    Specimen Description 05/21/2024                      Value:This result contains rich text formatting which cannot be displayed here.    Specimen Processing Deta* 05/21/2024                      Value:This result contains rich text formatting which cannot be displayed here.    Urine Culture 05/21/2024 No growth    Lab on 05/20/2024   Component Date Value    Prostate Specific Antige* 05/20/2024 3.07     Protime 05/20/2024 11.6     INR 05/20/2024 1.0     WBC 05/20/2024 9.2     nRBC 05/20/2024 0.0     RBC 05/20/2024 4.64     Hemoglobin 05/20/2024 15.1     Hematocrit 05/20/2024 45.5     MCV 05/20/2024 98     MCH 05/20/2024 32.5     MCHC 05/20/2024 33.2     RDW 05/20/2024 13.2     Platelets 05/20/2024 240     Neutrophils % 05/20/2024 54.7     Immature Granulocytes %,* 05/20/2024 0.2     Lymphocytes % 05/20/2024 31.2     Monocytes % 05/20/2024 8.3     Eosinophils % 05/20/2024 5.3     Basophils % 05/20/2024 0.3     Neutrophils Absolute 05/20/2024 5.02     Immature Granulocytes Ab* 05/20/2024 0.02     Lymphocytes Absolute 05/20/2024 2.87     Monocytes Absolute 05/20/2024 0.76     Eosinophils Absolute 05/20/2024 0.49 (H)     Basophils Absolute 05/20/2024 0.03     Glucose 05/20/2024 109 (H)      Sodium 05/20/2024 138     Potassium 05/20/2024 4.2     Chloride 05/20/2024 103     Bicarbonate 05/20/2024 24     Anion Gap 05/20/2024 15     Urea Nitrogen 05/20/2024 25 (H)     Creatinine 05/20/2024 1.12     eGFR 05/20/2024 67     Calcium 05/20/2024 9.8     Albumin 05/20/2024 4.4     Alkaline Phosphatase 05/20/2024 58     Total Protein 05/20/2024 6.9     AST 05/20/2024 18     Bilirubin, Total 05/20/2024 0.8     ALT 05/20/2024 29     Glucose 05/20/2024 107 (H)     Sodium 05/20/2024 137     Potassium 05/20/2024 4.2     Chloride 05/20/2024 103     Bicarbonate 05/20/2024 23     Anion Gap 05/20/2024 15     Urea Nitrogen 05/20/2024 25 (H)     Creatinine 05/20/2024 1.06     eGFR 05/20/2024 71     Calcium 05/20/2024 9.7    Office Visit on 05/20/2024   Component Date Value    POC Color, Urine 05/20/2024 Yellow     POC Appearance, Urine 05/20/2024 Clear     POC Glucose, Urine 05/20/2024 NEGATIVE     POC Bilirubin, Urine 05/20/2024 NEGATIVE     POC Ketones, Urine 05/20/2024 NEGATIVE     POC Specific Gravity, Ur* 05/20/2024 1.025     POC Blood, Urine 05/20/2024 NEGATIVE     POC PH, Urine 05/20/2024 5.5     POC Protein, Urine 05/20/2024 NEGATIVE     POC Urobilinogen, Urine 05/20/2024 1.0     Poc Nitrite, Urine 05/20/2024 NEGATIVE     POC Leukocytes, Urine 05/20/2024 NEGATIVE    Lab on 12/19/2023   Component Date Value    WBC 12/19/2023 11.4 (H)     nRBC 12/19/2023 0.0     RBC 12/19/2023 4.94     Hemoglobin 12/19/2023 15.7     Hematocrit 12/19/2023 47.8     MCV 12/19/2023 97     MCH 12/19/2023 31.8     MCHC 12/19/2023 32.8     RDW 12/19/2023 13.1     Platelets 12/19/2023 236     Neutrophils % 12/19/2023 53.8     Immature Granulocytes %,* 12/19/2023 0.3     Lymphocytes % 12/19/2023 31.5     Monocytes % 12/19/2023 7.4     Eosinophils % 12/19/2023 6.6     Basophils % 12/19/2023 0.4     Neutrophils Absolute 12/19/2023 6.14 (H)     Immature Granulocytes Ab* 12/19/2023 0.03     Lymphocytes Absolute 12/19/2023 3.59 (H)     Monocytes  Absolute 12/19/2023 0.85 (H)     Eosinophils Absolute 12/19/2023 0.75 (H)     Basophils Absolute 12/19/2023 0.05     Glucose 12/19/2023 98     Sodium 12/19/2023 140     Potassium 12/19/2023 4.1     Chloride 12/19/2023 102     Bicarbonate 12/19/2023 28     Anion Gap 12/19/2023 14     Urea Nitrogen 12/19/2023 27 (H)     Creatinine 12/19/2023 1.08     eGFR 12/19/2023 70     Calcium 12/19/2023 9.9     Albumin 12/19/2023 4.4     Alkaline Phosphatase 12/19/2023 52     Total Protein 12/19/2023 6.9     AST 12/19/2023 16     Bilirubin, Total 12/19/2023 0.7     ALT 12/19/2023 29         Assessment/Plan           1. Chest pain. Patient presented to [facility] July 7, 2017 with complaints of chest pain. He states he has had two cardiac catheters in the past, the last one about 15 years prior which he was told were normal. At that time he underwent a stress echo which was negative for ischemia. He also had an echocardiogram which showed mild LVH, EF of 65-69%, and mild aortic valve sclerosis.  Patient remains without any anginal type chest discomfort.  He did have a pharmacological nuclear stress test on 6/1/2023 that was negative for any ischemic or other perfusion defects.  Patient will continue current therapy return in 6 months for follow-up.     2. Hypertension.  Blood pressure appears to be well within normal range.  Will continue on lisinopril 20 mg daily.     3. Hyperlipidemia. Fasting lipid panel 12/2021 showed a cholesterol 131, HDL 42, LDL 70, triglyceride 95.  The patient's most recent labs from 5/26/2023 include cholesterol 137 LDL 71 HDL 39 triglyceride 130.  The CBC and comprehensive metabolic panels were normal.  Glycohemoglobin 5.6% TSH 1.95 PSA 2.33.  Patient will remain on atorvastatin 40 mg daily.  Recheck labs after next visit.  Continue atorvastatin 40 mg daily.     4. Obstructive sleep apnea.  Patient has had a longstanding history of confirmed obstructive sleep apnea.  He falls asleep at times  inappropriately In [employer] watching television.  He has good and bad nights for sleep.  He has had a CPAP mask for many years but it has not been evaluated or changed in a number of years.  He will be referred to the sleep medicine nurse practitioner for reevaluation.     5. Neck and spine surgery. After being ruled out for cardiac etiology with [facility] visit July 2017, patient saw neurology, Dr Manuel. He had neck surgery 02/2018 with Dr Jaramillo and apparently spine surgery may follow as he is still having low back pain.      6. Adenoma left adrenal. Patient had a CT of the chest and 7/2017 which showed a small, minute adenoma of the left adrenal.     7. Carotid US done 09/2017 showed less than 50% stenosis bilaterally.     8. PVRs done 09/2017 were negative.  Of note at the time of the patient's recent abdominal and pelvic CT urogram in 5/2024 he was noted to have severe intra-abdominal vascular calcifications.     9. Hx of vagal response. Wore Zio patch monitor 03/2022 that showed heart rate of 29-1 39 with an average of 84 bpm. Sinus rhythm. 2 SVT runs. Second-degree AV block Mobitz 1 was rarely present. Rare ectopy was noted. Patient triggered response for ectopy. Patient wears life alert necklace. Will notify office for any further presyncopal or syncopal events.    10.  Renal calculi.  This patient had an episode of gross hematuria several weeks ago without any associated pain.  He was seen by his primary care physician referred to a urologist.  Within a period of 1 week there was no visible hematuria.  He had an abdominal and pelvic CT urogram performed that showed multiple bilateral nonobstructing renal calculi 12 mm posterior urinary bladder calculus as well +10 mm left adrenal adenoma which was stable.  He was also noted to have severe vascular atherosclerotic disease.      ders:  No orders of the defined types were placed in this encounter.     Followup Appts:  Future Appointments   Date Time  Provider Department [place]   7/8/2024  9:15 AM [facility] ROOM 01 [facility]   7/23/2024  1:30 PM Neville Kohler PA-C [id number] [facility]   10/29/2024 11:10 AM Millicent Balbuena MD [id number] [facility]   11/21/2024 10:00 AM Natalie Page MD [id number] [facility]   12/12/2024  2:45 PM Milton Betancourt MD [id number] [facility]   12/19/2024 10:40 AM An Gutiérrez DO [id number] [facility]   4/14/2025  2:00 PM Otoniel Pino PA-C [id number] [facility]           ____________________________________________________________  Milton Betancourt MD  [facility]  Assistant Clinical Professor, [employer]  [facility]

## 2024-07-08 ENCOUNTER — PRE-ADMISSION TESTING (OUTPATIENT)
Dept: PREADMISSION TESTING | Facility: HOSPITAL | Age: 79
End: 2024-07-08
Payer: MEDICARE

## 2024-07-08 ENCOUNTER — APPOINTMENT (OUTPATIENT)
Dept: PREADMISSION TESTING | Facility: HOSPITAL | Age: 79
End: 2024-07-08
Payer: MEDICARE

## 2024-07-08 ENCOUNTER — LAB (OUTPATIENT)
Dept: LAB | Facility: LAB | Age: 79
End: 2024-07-08
Payer: MEDICARE

## 2024-07-08 VITALS
WEIGHT: 224.1 LBS | HEIGHT: 71 IN | TEMPERATURE: 98.6 F | DIASTOLIC BLOOD PRESSURE: 71 MMHG | OXYGEN SATURATION: 95 % | SYSTOLIC BLOOD PRESSURE: 117 MMHG | HEART RATE: 71 BPM | BODY MASS INDEX: 31.37 KG/M2

## 2024-07-08 DIAGNOSIS — Z01.818 PREOP TESTING: ICD-10-CM

## 2024-07-08 DIAGNOSIS — N21.0 BLADDER STONES: ICD-10-CM

## 2024-07-08 DIAGNOSIS — N13.8 BPH WITH OBSTRUCTION/LOWER URINARY TRACT SYMPTOMS: ICD-10-CM

## 2024-07-08 DIAGNOSIS — N40.1 BPH WITH OBSTRUCTION/LOWER URINARY TRACT SYMPTOMS: ICD-10-CM

## 2024-07-08 DIAGNOSIS — R31.9 HEMATURIA, UNSPECIFIED TYPE: ICD-10-CM

## 2024-07-08 LAB
ANION GAP SERPL CALC-SCNC: 11 MMOL/L
BUN SERPL-MCNC: 19 MG/DL (ref 8–25)
CALCIUM SERPL-MCNC: 9.8 MG/DL (ref 8.5–10.4)
CHLORIDE SERPL-SCNC: 104 MMOL/L (ref 97–107)
CO2 SERPL-SCNC: 25 MMOL/L (ref 24–31)
CREAT SERPL-MCNC: 1.1 MG/DL (ref 0.4–1.6)
EGFRCR SERPLBLD CKD-EPI 2021: 68 ML/MIN/1.73M*2
ERYTHROCYTE [DISTWIDTH] IN BLOOD BY AUTOMATED COUNT: 12.9 % (ref 11.5–14.5)
GLUCOSE SERPL-MCNC: 103 MG/DL (ref 65–99)
HCT VFR BLD AUTO: 44.5 % (ref 41–52)
HGB BLD-MCNC: 14.5 G/DL (ref 13.5–17.5)
INR PPP: 1 (ref 0.9–1.2)
MCH RBC QN AUTO: 31.9 PG (ref 26–34)
MCHC RBC AUTO-ENTMCNC: 32.6 G/DL (ref 32–36)
MCV RBC AUTO: 98 FL (ref 80–100)
NRBC BLD-RTO: 0 /100 WBCS (ref 0–0)
PLATELET # BLD AUTO: 246 X10*3/UL (ref 150–450)
POTASSIUM SERPL-SCNC: 5.1 MMOL/L (ref 3.4–5.1)
PROTHROMBIN TIME: 10.7 SECONDS (ref 9.3–12.7)
RBC # BLD AUTO: 4.55 X10*6/UL (ref 4.5–5.9)
SODIUM SERPL-SCNC: 140 MMOL/L (ref 133–145)
WBC # BLD AUTO: 8.9 X10*3/UL (ref 4.4–11.3)

## 2024-07-08 PROCEDURE — 93010 ELECTROCARDIOGRAM REPORT: CPT | Performed by: INTERNAL MEDICINE

## 2024-07-08 PROCEDURE — 85027 COMPLETE CBC AUTOMATED: CPT

## 2024-07-08 PROCEDURE — 93005 ELECTROCARDIOGRAM TRACING: CPT

## 2024-07-08 PROCEDURE — 80048 BASIC METABOLIC PNL TOTAL CA: CPT

## 2024-07-08 PROCEDURE — 87086 URINE CULTURE/COLONY COUNT: CPT

## 2024-07-08 PROCEDURE — 85610 PROTHROMBIN TIME: CPT

## 2024-07-08 PROCEDURE — 36415 COLL VENOUS BLD VENIPUNCTURE: CPT

## 2024-07-08 ASSESSMENT — DUKE ACTIVITY SCORE INDEX (DASI)
CAN YOU DO HEAVY WORK AROUND THE HOUSE LIKE SCRUBBING FLOORS OR LIFTING AND MOVING HEAVY FURNITURE: NO
CAN YOU WALK INDOORS, SUCH AS AROUND YOUR HOUSE: YES
CAN YOU CLIMB A FLIGHT OF STAIRS OR WALK UP A HILL: YES
TOTAL_SCORE: 42.7
DASI METS SCORE: 8
CAN YOU PARTICIPATE IN MODERATE RECREATIONAL ACTIVITIES LIKE GOLF, BOWLING, DANCING, DOUBLES TENNIS OR THROWING A BASEBALL OR FOOTBALL: YES
CAN YOU DO MODERATE WORK AROUND THE HOUSE LIKE VACUUMING, SWEEPING FLOORS OR CARRYING GROCERIES: YES
CAN YOU RUN A SHORT DISTANCE: YES
CAN YOU DO LIGHT WORK AROUND THE HOUSE LIKE DUSTING OR WASHING DISHES: YES
CAN YOU WALK A BLOCK OR TWO ON LEVEL GROUND: YES
CAN YOU TAKE CARE OF YOURSELF (EAT, DRESS, BATHE, OR USE TOILET): YES
CAN YOU PARTICIPATE IN STRENOUS SPORTS LIKE SWIMMING, SINGLES TENNIS, FOOTBALL, BASKETBALL, OR SKIING: NO
CAN YOU DO YARD WORK LIKE RAKING LEAVES, WEEDING OR PUSHING A MOWER: YES
CAN YOU HAVE SEXUAL RELATIONS: YES

## 2024-07-08 ASSESSMENT — ENCOUNTER SYMPTOMS
EYES NEGATIVE: 1
NECK NEGATIVE: 1
NEUROLOGICAL NEGATIVE: 1
RESPIRATORY NEGATIVE: 1
DIFFICULTY URINATING: 1
CONSTITUTIONAL NEGATIVE: 1
ENDOCRINE NEGATIVE: 1
CONSTIPATION: 1
ARTHRALGIAS: 1
CARDIOVASCULAR NEGATIVE: 1

## 2024-07-08 ASSESSMENT — PAIN SCALES - GENERAL: PAINLEVEL_OUTOF10: 0 - NO PAIN

## 2024-07-08 ASSESSMENT — PAIN - FUNCTIONAL ASSESSMENT: PAIN_FUNCTIONAL_ASSESSMENT: 0-10

## 2024-07-08 NOTE — CPM/PAT H&P
CPM/PAT Evaluation       Name: Cl Feliz (Cl Feliz)  /Age: 1945/78 y.o.     In-Person       Chief Complaint: BPH with obstruction/lower urinary tract symptoms, Bladder stones     HPI  A 78-year-old male with BPH with obstruction/lower urinary tract symptoms, bladder stones.  History of BPH with recent hematuria s/p  cystoscopy showing bladder calculus-patient also has renal calculus.  He has had progressive symptoms of urinary urgency, urinary frequency, nocturia, and some urinary retention for the past 2 years.  Denies fever, chills, chest pain, shortness of breath, syncope, recent hematuria or dysuria.  He is scheduled for cystolitholapaxy, HoLEP.    Past Medical History:   Diagnosis Date    Atherosclerosis     Bladder stones 2024    BPH (benign prostatic hyperplasia)     Cervical spinal stenosis 10/10/2023    GERD (gastroesophageal reflux disease) 2023    Gout 2023    HLD (hyperlipidemia)     HTN (hypertension)     Lumbar stenosis 2023    Personal history of other endocrine, nutritional and metabolic disease 2016    History of hypoglycemia    PVD (peripheral vascular disease) (CMS-HCC) 2023    Sleep apnea     Supraventricular tachycardia (CMS-HCC) 2023       Past Surgical History:   Procedure Laterality Date    CARDIAC CATHETERIZATION      x 2    OTHER SURGICAL HISTORY  2019    Neck surgery    VASECTOMY  2016    Surgery Vas Deferens Vasectomy         Allergies   Allergen Reactions    Penicillin G Unknown    Penicillins Unknown    Adhesive Tape-Silicones Rash     Current Outpatient Medications   Medication Sig Dispense Refill    allopurinol (Zyloprim) 300 mg tablet Take 1 tablet (300 mg) by mouth once daily. 90 tablet 3    aspirin 81 mg EC tablet Take 1 tablet (81 mg) by mouth once daily.      atorvastatin (Lipitor) 40 mg tablet Take 1 tablet (40 mg) by mouth once daily.      calcium polycarbophil (FIBERCON ORAL) Take 2 tablets by mouth once daily  at bedtime.      cholecalciferol (Vitamin D-3) 25 MCG (1000 UT) capsule Take 2 capsules (50 mcg) by mouth once daily.      coenzyme Q-10 100 mg capsule Take by mouth.      cyclobenzaprine (Flexeril) 10 mg tablet Take 1 tablet (10 mg) by mouth 2 times a day as needed for muscle spasms. 30 tablet 0    fluoride, sodium, (Prevident 5000 Booster) 1.1 % dental paste BRUSH TWICE A DAY. NO EATING / RINSING / DRINKING FOR 30 MINUTES AFTER      lisinopril 20 mg tablet take one tablet by mouth every day 90 tablet 1    meloxicam (Mobic) 15 mg tablet TAKE ONE TABLET BY MOUTH EVERY DAY AS NEEDED 90 tablet 0    multivit-min/folic acid/vit K1 (MULTI FOR HIM, NO IRON, ORAL) Take by mouth.      omeprazole (PriLOSEC) 20 mg DR capsule Take 1 capsule (20 mg) by mouth once daily.      sildenafil (Viagra) 50 mg tablet TAKE 1 TABLET BY MOUTH EVERY DAY AS NEEDED APPROXIMATELY 1 HOUR BEFORE SEXUAL ACTIVITY      triamcinolone (Kenalog) 0.1 % ointment apply twice daily to affected areas for no more than 2 weeks per month.      fluocinolone (DermOtic) 0.01 % ear drops Instill 5 drops in the affected ear once a day as needed for itching (Patient not taking: Reported on 6/19/2024) 20 mL 3    tamsulosin (Flomax) 0.4 mg 24 hr capsule Take 1 capsule (0.4 mg) by mouth once daily. (Patient not taking: Reported on 7/8/2024) 30 capsule 2     No current facility-administered medications for this visit.         PAT ROS:   Constitutional:   neg    Neuro/Psych:   neg    Eyes:   neg    Ears:   neg    Nose:   neg    Mouth:   Throat:   neg    Neck:   neg    Cardio:   neg    Respiratory:   neg    Endocrine:   neg    GI:    constipation  :    Urinary urgency, urinary frequency, nocturia, urinary retention   difficulty urinating  Musculoskeletal:    arthralgias  Hematologic:   neg    Skin:  neg        Physical Exam  Vitals reviewed.   HENT:      Head: Normocephalic and atraumatic.      Mouth/Throat:      Mouth: Mucous membranes are moist.   Eyes:      Pupils:  "Pupils are equal, round, and reactive to light.      Comments: Reading glasses   Cardiovascular:      Rate and Rhythm: Normal rate and regular rhythm.   Pulmonary:      Effort: Pulmonary effort is normal.      Breath sounds: Normal breath sounds.   Abdominal:      Palpations: Abdomen is soft.   Musculoskeletal:         General: Normal range of motion.      Cervical back: Normal range of motion.      Comments: Chronic back pain, hunched gait   Skin:     General: Skin is warm and dry.   Neurological:      Mental Status: He is alert and oriented to person, place, and time.   Psychiatric:         Mood and Affect: Mood normal.          PAT AIRWAY:   Airway:     Mallampati::  II    Neck ROM::  Full  normal     upper dentures and partials      /71   Pulse 71   Temp 37 °C (98.6 °F) (Temporal)   Ht 1.803 m (5' 11\")   Wt 102 kg (224 lb 1.6 oz)   SpO2 95%   BMI 31.26 kg/m²     STOP BANG 6  CHADS 2 score: 4.0%  DASI score: 42.7  METS score: 8  Revised cardiac risk index: 0.4%  ASA III  ARISCAT 1.6%   Cardiac stress test 6/1/2023 no ischemia, LVEF 65%  PAT orders by surgeon urine culture, PT/INR, CBC, BMP  PAT orders EKG-sinus bradycardia, moderate voltage criteria for LVH, may be normal variant (preliminary)    Assessment and Plan:     BPH with obstruction/lower urinary tract symptoms, Bladder stones Plan:  Cystolitholapaxy, HoLEP  Atherosclerosis, SVT follows with cardiology Dr. Betancourt last visit 6/6/2024  Hypertension  Hyperlipidemia  GERD  Cervical/lumbar spinal stenosis s/p cervical surgery c4-c7 2018/ lumbar radiculopathy/chronic back pain  PVD  WES uses BiPAP        "

## 2024-07-08 NOTE — PREPROCEDURE INSTRUCTIONS
Medication List            Accurate as of July 8, 2024  9:04 AM. Always use your most recent med list.                allopurinol 300 mg tablet  Commonly known as: Zyloprim  Take 1 tablet (300 mg) by mouth once daily.  Medication Adjustments for Surgery: Other (Comment)  Notes to patient: CAN TAKE THE NIGHT BEFORE SURGERY     aspirin 81 mg EC tablet  Medication Adjustments for Surgery: Stop 7 days before surgery     atorvastatin 40 mg tablet  Commonly known as: Lipitor  Medication Adjustments for Surgery: Other (Comment)  Notes to patient: CAN TAKE THE NIGHT BEFORE SURGERY     cholecalciferol 25 MCG (1000 UT) capsule  Commonly known as: Vitamin D-3  Medication Adjustments for Surgery: Stop 7 days before surgery     coenzyme Q-10 100 mg capsule  Medication Adjustments for Surgery: Stop 7 days before surgery     cyclobenzaprine 10 mg tablet  Commonly known as: Flexeril  Take 1 tablet (10 mg) by mouth 2 times a day as needed for muscle spasms.  Medication Adjustments for Surgery: Other (Comment)  Notes to patient: CAN TAKE THE NIGHT BEFORE SURGERY     FIBERCON ORAL  Medication Adjustments for Surgery: Stop 7 days before surgery        fluoride (sodium) 1.1 % dental paste  Commonly known as: Prevident 5000 Booster  Medication Adjustments for Surgery: Other (Comment)  Notes to patient: CAN USE THE MORNING OF SURGERY     lisinopril 20 mg tablet  take one tablet by mouth every day  Medication Adjustments for Surgery: Other (Comment)  Notes to patient: HOLD THE NIGHT BEFORE SURGERY     meloxicam 15 mg tablet  Commonly known as: Mobic  TAKE ONE TABLET BY MOUTH EVERY DAY AS NEEDED  Medication Adjustments for Surgery: Stop 7 days before surgery     MULTI FOR HIM (NO IRON) ORAL  Medication Adjustments for Surgery: Stop 7 days before surgery     omeprazole 20 mg DR capsule  Commonly known as: PriLOSEC  Medication Adjustments for Surgery: Other (Comment)  Notes to patient: CAN TAKE THE NIGHT BEFORE SURGERY     sildenafil 50 mg  tablet  Commonly known as: Viagra  Medication Adjustments for Surgery: Stop 3 days before surgery  Notes to patient: LAST DOSE 7/18/24        triamcinolone 0.1 % ointment  Commonly known as: Kenalog  Medication Adjustments for Surgery: Other (Comment)  Notes to patient: HOLD THE MORNING OF SURGERY                 Preoperative Fasting Guidelines    Why must I stop eating and drinking near surgery time?  With sedation, food or liquid in your stomach can enter your lungs causing serious complications  Increases nausea and vomiting    When do I need to stop eating and drinking before my surgery?  Do not eat any food or drink any liquids after midnight the night before your surgery/procedure.  You may have sips of water to take medications.    PAT DISCHARGE INSTRUCTIONS    Please call the Same Day Surgery (SDS) Department of the hospital where your procedure will be performed after 2:00 PM the day before your surgery. If you are scheduled on a Monday, or a Tuesday following a Monday holiday, you will need to call on the last business day prior to your surgery.    [facility]  [id number] [place] [address], [id number] [phone]  [facility]  [address] [id number] [phone]  [facility] Floor    [address] [place] [address]  [phone]    Please let your surgeon know if:      You develop any open sores, shingles, burning or painful urination as these may increase your risk of an infection.   You no longer wish to have the surgery.   Any other personal circumstances change that may lead to the need to cancel or defer this surgery-such as being sick or getting admitted to any hospital within one week of your planned procedure.    Your contact details change, such as a change of address or phone number.    Starting now:     Please DO NOT drink alcohol or smoke for 24  hours before surgery. It is well known that quitting smoking can make a huge difference to your health and recovery from surgery. The longer you abstain from smoking, the better your chances of a healthy recovery. If you need help with quitting, call 3-800QUIT-NOW to be connected to a trained counselor who will discuss the best methods to help you quit.     Before your surgery:    Please stop all supplements 7 days prior to surgery. Or as directed by your surgeon.   Please stop taking NSAID pain medicine such as Advil and Motrin 7 days before surgery.    If you develop any fever, cough, cold, rashes, cuts, scratches, scrapes, urinary symptoms or infection anywhere on your body (including teeth and gums) prior to surgery, please call your surgeon’s office as soon as possible. This may require treatment to reduce the chance of cancellation on the day of surgery.    The day before your surgery:   DIET- Please follow the diet instructions at the top of your packet.   Get a good night’s rest.  Use the special soap for bathing if you have been instructed to use one.    Scheduled surgery times may change and you will be notified if this occurs - please check your personal voicemail for any updates.     On the morning of surgery:   Wear comfortable, loose fitting clothes which open in the front. Please do not wear moisturizers, creams, lotions, makeup or perfume.    Please bring with you to surgery:   Photo ID and insurance card   Current list of medicines and allergies   Pacemaker/ Defibrillator/Heart stent cards   CPAP machine and mask    Slings/ splints/ crutches   A copy of your complete advanced directive/DHPOA.    Please do NOT bring with you to surgery:   All jewelry and valuables should be left at home.   Prosthetic devices such as contact lenses, hearing aids, dentures, eyelash extensions, hairpins and body piercings must be removed prior to going in to the surgical suite.    After outpatient surgery:   A  responsible adult MUST accompany you at the time of discharge and stay with you for 24 hours after your surgery. You may NOT drive yourself home after surgery.    Do not drive, operate machinery, make critical decisions or do activities that require co-ordination or balance until after a night’s sleep.   Do not drink alcoholic beverages for 24 hours.   Instructions for resuming your medications will be provided by your surgeon.    CALL YOUR DOCTOR AFTER SURGERY IF YOU HAVE:     Chills and/or a fever of 101° F or higher.    Redness, swelling, pus or drainage from your surgical wound or a bad smell from the wound.    Lightheadedness, fainting or confusion.    Persistent vomiting (throwing up) and are not able to eat or drink for 12 hours.    Three or more loose, watery bowel movements in 24 hours (diarrhea).   Difficulty or pain while urinating( after non-urological surgery)    Pain and swelling in your legs, especially if it is only on one side.    Difficulty breathing or are breathing faster than normal.    Any new concerning symptoms.

## 2024-07-09 ENCOUNTER — APPOINTMENT (OUTPATIENT)
Dept: PREADMISSION TESTING | Facility: HOSPITAL | Age: 79
End: 2024-07-09
Payer: MEDICARE

## 2024-07-09 LAB — BACTERIA UR CULT: NORMAL

## 2024-07-10 LAB
ATRIAL RATE: 58 BPM
P AXIS: 1 DEGREES
P OFFSET: 165 MS
P ONSET: 108 MS
PR INTERVAL: 206 MS
Q ONSET: 211 MS
QRS COUNT: 10 BEATS
QRS DURATION: 98 MS
QT INTERVAL: 426 MS
QTC CALCULATION(BAZETT): 418 MS
QTC FREDERICIA: 421 MS
R AXIS: -22 DEGREES
T AXIS: 34 DEGREES
T OFFSET: 424 MS
VENTRICULAR RATE: 58 BPM

## 2024-07-22 ENCOUNTER — HOSPITAL ENCOUNTER (OUTPATIENT)
Facility: HOSPITAL | Age: 79
Setting detail: OUTPATIENT SURGERY
Discharge: HOME | End: 2024-07-22
Attending: UROLOGY | Admitting: UROLOGY
Payer: MEDICARE

## 2024-07-22 ENCOUNTER — ANESTHESIA EVENT (OUTPATIENT)
Dept: OPERATING ROOM | Facility: HOSPITAL | Age: 79
End: 2024-07-22
Payer: MEDICARE

## 2024-07-22 ENCOUNTER — ANESTHESIA (OUTPATIENT)
Dept: OPERATING ROOM | Facility: HOSPITAL | Age: 79
End: 2024-07-22
Payer: MEDICARE

## 2024-07-22 ENCOUNTER — HOSPITAL ENCOUNTER (EMERGENCY)
Facility: HOSPITAL | Age: 79
Discharge: HOME | End: 2024-07-22
Payer: MEDICARE

## 2024-07-22 ENCOUNTER — TELEPHONE (OUTPATIENT)
Dept: UROLOGY | Facility: HOSPITAL | Age: 79
End: 2024-07-22

## 2024-07-22 VITALS
HEART RATE: 72 BPM | DIASTOLIC BLOOD PRESSURE: 70 MMHG | WEIGHT: 220.68 LBS | HEIGHT: 71 IN | OXYGEN SATURATION: 92 % | RESPIRATION RATE: 16 BRPM | BODY MASS INDEX: 30.9 KG/M2 | TEMPERATURE: 97.9 F | SYSTOLIC BLOOD PRESSURE: 131 MMHG

## 2024-07-22 VITALS
SYSTOLIC BLOOD PRESSURE: 118 MMHG | BODY MASS INDEX: 31.64 KG/M2 | RESPIRATION RATE: 18 BRPM | OXYGEN SATURATION: 94 % | HEIGHT: 71 IN | TEMPERATURE: 98.6 F | WEIGHT: 225.97 LBS | DIASTOLIC BLOOD PRESSURE: 68 MMHG | HEART RATE: 85 BPM

## 2024-07-22 DIAGNOSIS — T83.9XXA URINARY CATHETER COMPLICATION, INITIAL ENCOUNTER (CMS-HCC): Primary | ICD-10-CM

## 2024-07-22 DIAGNOSIS — N13.8 BPH WITH OBSTRUCTION/LOWER URINARY TRACT SYMPTOMS: ICD-10-CM

## 2024-07-22 DIAGNOSIS — N21.0 BLADDER STONES: ICD-10-CM

## 2024-07-22 DIAGNOSIS — N40.1 BPH WITH OBSTRUCTION/LOWER URINARY TRACT SYMPTOMS: ICD-10-CM

## 2024-07-22 PROCEDURE — 3600000004 HC OR TIME - INITIAL BASE CHARGE - PROCEDURE LEVEL FOUR: Performed by: UROLOGY

## 2024-07-22 PROCEDURE — 2500000004 HC RX 250 GENERAL PHARMACY W/ HCPCS (ALT 636 FOR OP/ED): Performed by: ANESTHESIOLOGIST ASSISTANT

## 2024-07-22 PROCEDURE — 2500000004 HC RX 250 GENERAL PHARMACY W/ HCPCS (ALT 636 FOR OP/ED): Performed by: UROLOGY

## 2024-07-22 PROCEDURE — 2500000005 HC RX 250 GENERAL PHARMACY W/O HCPCS: Performed by: ANESTHESIOLOGIST ASSISTANT

## 2024-07-22 PROCEDURE — 2500000005 HC RX 250 GENERAL PHARMACY W/O HCPCS: Performed by: UROLOGY

## 2024-07-22 PROCEDURE — 52318 REMOVE BLADDER STONE: CPT | Performed by: UROLOGY

## 2024-07-22 PROCEDURE — 3600000009 HC OR TIME - EACH INCREMENTAL 1 MINUTE - PROCEDURE LEVEL FOUR: Performed by: UROLOGY

## 2024-07-22 PROCEDURE — 3700000001 HC GENERAL ANESTHESIA TIME - INITIAL BASE CHARGE: Performed by: UROLOGY

## 2024-07-22 PROCEDURE — C1769 GUIDE WIRE: HCPCS | Performed by: UROLOGY

## 2024-07-22 PROCEDURE — C1758 CATHETER, URETERAL: HCPCS | Performed by: UROLOGY

## 2024-07-22 PROCEDURE — 7100000010 HC PHASE TWO TIME - EACH INCREMENTAL 1 MINUTE: Performed by: UROLOGY

## 2024-07-22 PROCEDURE — 7100000001 HC RECOVERY ROOM TIME - INITIAL BASE CHARGE: Performed by: UROLOGY

## 2024-07-22 PROCEDURE — 99100 ANES PT EXTEME AGE<1 YR&>70: CPT | Performed by: ANESTHESIOLOGY

## 2024-07-22 PROCEDURE — 3700000002 HC GENERAL ANESTHESIA TIME - EACH INCREMENTAL 1 MINUTE: Performed by: UROLOGY

## 2024-07-22 PROCEDURE — 2720000007 HC OR 272 NO HCPCS: Performed by: UROLOGY

## 2024-07-22 PROCEDURE — A52649 PR LASER ENUCLEATION PROSTATE W MORCELLATION: Performed by: ANESTHESIOLOGY

## 2024-07-22 PROCEDURE — C1889 IMPLANT/INSERT DEVICE, NOC: HCPCS | Performed by: UROLOGY

## 2024-07-22 PROCEDURE — 99281 EMR DPT VST MAYX REQ PHY/QHP: CPT

## 2024-07-22 PROCEDURE — A52649 PR LASER ENUCLEATION PROSTATE W MORCELLATION: Performed by: ANESTHESIOLOGIST ASSISTANT

## 2024-07-22 PROCEDURE — 52649 PROSTATE LASER ENUCLEATION: CPT | Performed by: UROLOGY

## 2024-07-22 PROCEDURE — 82365 CALCULUS SPECTROSCOPY: CPT | Performed by: UROLOGY

## 2024-07-22 PROCEDURE — C1782 MORCELLATOR: HCPCS | Performed by: UROLOGY

## 2024-07-22 PROCEDURE — 7100000009 HC PHASE TWO TIME - INITIAL BASE CHARGE: Performed by: UROLOGY

## 2024-07-22 PROCEDURE — 7100000002 HC RECOVERY ROOM TIME - EACH INCREMENTAL 1 MINUTE: Performed by: UROLOGY

## 2024-07-22 RX ORDER — PROPOFOL 10 MG/ML
INJECTION, EMULSION INTRAVENOUS AS NEEDED
Status: DISCONTINUED | OUTPATIENT
Start: 2024-07-22 | End: 2024-07-22

## 2024-07-22 RX ORDER — ONDANSETRON HYDROCHLORIDE 2 MG/ML
INJECTION, SOLUTION INTRAVENOUS AS NEEDED
Status: DISCONTINUED | OUTPATIENT
Start: 2024-07-22 | End: 2024-07-22

## 2024-07-22 RX ORDER — PHENAZOPYRIDINE HYDROCHLORIDE 100 MG/1
100 TABLET, FILM COATED ORAL 3 TIMES DAILY
Qty: 42 TABLET | Refills: 0 | Status: SHIPPED | OUTPATIENT
Start: 2024-07-22 | End: 2024-08-05

## 2024-07-22 RX ORDER — FENTANYL CITRATE 50 UG/ML
50 INJECTION, SOLUTION INTRAMUSCULAR; INTRAVENOUS EVERY 5 MIN PRN
Status: DISCONTINUED | OUTPATIENT
Start: 2024-07-22 | End: 2024-07-22 | Stop reason: HOSPADM

## 2024-07-22 RX ORDER — SODIUM CHLORIDE, SODIUM LACTATE, POTASSIUM CHLORIDE, CALCIUM CHLORIDE 600; 310; 30; 20 MG/100ML; MG/100ML; MG/100ML; MG/100ML
100 INJECTION, SOLUTION INTRAVENOUS CONTINUOUS
Status: DISCONTINUED | OUTPATIENT
Start: 2024-07-22 | End: 2024-07-22 | Stop reason: HOSPADM

## 2024-07-22 RX ORDER — LIDOCAINE HYDROCHLORIDE 20 MG/ML
JELLY TOPICAL AS NEEDED
Status: DISCONTINUED | OUTPATIENT
Start: 2024-07-22 | End: 2024-07-22 | Stop reason: HOSPADM

## 2024-07-22 RX ORDER — FENTANYL CITRATE 50 UG/ML
25 INJECTION, SOLUTION INTRAMUSCULAR; INTRAVENOUS EVERY 5 MIN PRN
Status: DISCONTINUED | OUTPATIENT
Start: 2024-07-22 | End: 2024-07-22 | Stop reason: HOSPADM

## 2024-07-22 RX ORDER — CIPROFLOXACIN 2 MG/ML
400 INJECTION, SOLUTION INTRAVENOUS ONCE
Status: COMPLETED | OUTPATIENT
Start: 2024-07-22 | End: 2024-07-22

## 2024-07-22 RX ORDER — FENTANYL CITRATE 50 UG/ML
INJECTION, SOLUTION INTRAMUSCULAR; INTRAVENOUS AS NEEDED
Status: DISCONTINUED | OUTPATIENT
Start: 2024-07-22 | End: 2024-07-22

## 2024-07-22 RX ORDER — CIPROFLOXACIN 500 MG/1
500 TABLET ORAL 2 TIMES DAILY
Qty: 14 TABLET | Refills: 0 | Status: SHIPPED | OUTPATIENT
Start: 2024-07-22 | End: 2024-07-29

## 2024-07-22 RX ORDER — LABETALOL HYDROCHLORIDE 5 MG/ML
INJECTION, SOLUTION INTRAVENOUS AS NEEDED
Status: DISCONTINUED | OUTPATIENT
Start: 2024-07-22 | End: 2024-07-22

## 2024-07-22 RX ORDER — LIDOCAINE HYDROCHLORIDE 10 MG/ML
INJECTION, SOLUTION INTRAVENOUS AS NEEDED
Status: DISCONTINUED | OUTPATIENT
Start: 2024-07-22 | End: 2024-07-22

## 2024-07-22 RX ORDER — ROCURONIUM BROMIDE 10 MG/ML
INJECTION, SOLUTION INTRAVENOUS AS NEEDED
Status: DISCONTINUED | OUTPATIENT
Start: 2024-07-22 | End: 2024-07-22

## 2024-07-22 SDOH — HEALTH STABILITY: MENTAL HEALTH: CURRENT SMOKER: 0

## 2024-07-22 ASSESSMENT — PAIN - FUNCTIONAL ASSESSMENT
PAIN_FUNCTIONAL_ASSESSMENT: 0-10

## 2024-07-22 ASSESSMENT — COLUMBIA-SUICIDE SEVERITY RATING SCALE - C-SSRS
1. IN THE PAST MONTH, HAVE YOU WISHED YOU WERE DEAD OR WISHED YOU COULD GO TO SLEEP AND NOT WAKE UP?: NO
6. HAVE YOU EVER DONE ANYTHING, STARTED TO DO ANYTHING, OR PREPARED TO DO ANYTHING TO END YOUR LIFE?: NO
6. HAVE YOU EVER DONE ANYTHING, STARTED TO DO ANYTHING, OR PREPARED TO DO ANYTHING TO END YOUR LIFE?: NO
1. IN THE PAST MONTH, HAVE YOU WISHED YOU WERE DEAD OR WISHED YOU COULD GO TO SLEEP AND NOT WAKE UP?: NO
2. HAVE YOU ACTUALLY HAD ANY THOUGHTS OF KILLING YOURSELF?: NO
2. HAVE YOU ACTUALLY HAD ANY THOUGHTS OF KILLING YOURSELF?: NO

## 2024-07-22 ASSESSMENT — PAIN SCALES - GENERAL
PAINLEVEL_OUTOF10: 0 - NO PAIN
PAINLEVEL_OUTOF10: 4
PAINLEVEL_OUTOF10: 0 - NO PAIN
PAIN_LEVEL: 0
PAINLEVEL_OUTOF10: 0 - NO PAIN
PAINLEVEL_OUTOF10: 0 - NO PAIN

## 2024-07-22 ASSESSMENT — PAIN DESCRIPTION - LOCATION: LOCATION: PENIS

## 2024-07-22 NOTE — OP NOTE
HoLEP, Cystolitholapaxy (  Morcellator, HoLEP set , P - 120 ) Operative Note     Date: 2024  OR Location: [facility]    Name: Cl Feliz, : 1945, Age: 78 y.o., MRN: [id number], Sex: male    Diagnosis  Pre-op Diagnosis      * BPH with obstruction/lower urinary tract symptoms [N40.1, N13.8]     * Bladder stones [N21.0] Post-op Diagnosis     * BPH with obstruction/lower urinary tract symptoms [N40.1, N13.8]     * Bladder stones [N21.0]     Procedures  HoLEP  15265 - NC LASER ENUCLEATION PROSTATE W/MORCELLATION    Cystolitholapaxy (  Morcellator, HoLEP set , P - 120 )  [id number] - NC REMOVE BLADDER STONE,>2.5 CM      Surgeons      * Millicent Balbuena - Primary    Resident/Fellow/Other Assistant:  Surgeons and Role:  * No surgeons found with a matching role *    Procedure Summary  Anesthesia: General  ASA: III  Anesthesia Staff: Anesthesiologist: Gabbi Boone MD MPH  C-AA: FELIPE Lopez  Estimated Blood Loss: 10mL  Intra-op Medications:   Administrations occurring from 0700 to 0900 on 24:   Medication Name Total Dose   lidocaine 2 % mucosal jelly (Uro-Jet) 1 Application   lactated Ringer's infusion 83.33 mL   ciprofloxacin (Cipro) 400 mg in dextrose 5%  mL 400 mg              Anesthesia Record               Intraprocedure I/O Totals          Intake    lactated Ringer's infusion 1000.00 mL    ciprofloxacin (Cipro) 400 mg in dextrose 5%  mL 200.00 mL    Total Intake 1200 mL          Specimen:   ID Type Source Tests Collected by Time   1 : SPECIMEN PLACED IN FORMALIN AND SENT TO LAB FOR ANALYSIS Tissue PROSTATE HOLEP SURGICAL PATHOLOGY EXAM Esme Alejandre RN 2024 0833        Staff:   Circulator: Esme Robert Person: Jess Ureñaub Person: Veronika         Drains and/or Catheters:   Urethral Catheter Coude 24 Fr. (Active)       Tourniquet Times:         Implants:     Findings: BPH, bladder stone 2.5 cm    Indications: Cl Feliz is an 78 y.o. male who is having surgery for BPH with  obstruction/lower urinary tract symptoms [N40.1, N13.8]  Bladder stones [N21.0].     The patient was seen in the preoperative area. The risks, benefits, complications, treatment options, non-operative alternatives, expected recovery and outcomes were discussed with the patient. The possibilities of reaction to medication, pulmonary aspiration, injury to surrounding structures, bleeding, recurrent infection, the need for additional procedures, failure to diagnose a condition, and creating a complication requiring transfusion or operation were discussed with the patient. The patient concurred with the proposed plan, giving informed consent.  The site of surgery was properly noted/marked if necessary per policy. The patient has been actively warmed in preoperative area. Preoperative antibiotics have been ordered and given within 1 hours of incision. Venous thrombosis prophylaxis have been ordered including bilateral sequential compression devices    Procedure Details: Preoperative diagnosis: BPH with LUTs, bladder stone >2.5 cm    Postoperative diagnosis: same    Procedure: Holmium laser nucleation of prostate and tissue morcellation, cystolithalopaxy    Anesthesia: general    IVF: see anesthesia report    EBL: 5 ml    Complications: none    Catheters: 22 Fr 3-way Shaw catheter    Specimen: prostate chips    Disposition: PACU in stable condition       Enucleation time: 23  Total laser time: 33  Total energy used: 185,680       Patient is a 78 year-old male with significant obstructive and irritative voiding symptoms not responsive to maximal medical therapy.  Ambulatory evaluation demonstrated him to be a good candidate for HoLEP procedure.  Risks and alternatives were discussed in great detail, he singed the informed consent and agreed to proceed    50 ml of lubricant were injected to the urethra.  Urethral meatus was dilated with repeat sounds to 30 Fr caliber    A 26 Albanian Martínez resectoscope was inserted.  The  anterior urethra was normal, there was good coaptation of the membranous urethra, there was a large obstructing prostate.  The bladder was normal , Both ureteral orifices were identified. Bladder stone was visualized, >2.5 cm.    Stone was fragmented using 550 micron holmium laser fiber and flushed out of the bladder.  This was passed off the table.    We started the enucleation using a 550 µm holmium laser fiber.    A circumferential incision was made in the urethra proximal to the sphincter.  It was deepened anteriorly and laterally.  We then entered the space between the capsule and the adenoma bluntly lateral to the verumontanum.  This was done bilaterally.  The posterior plane was developed bilaterally and connected by cutting the fibers proximal to the verumontanum.  The resection was carried as far proximally as possible.    We then returned to the initial incision in the urethra and detached the lateral attachments between the sphincter and the adenoma.  We were able to identify the lateral plane and developed it as proximally as possible by connecting it to the posterior plane.    We then turned to free the anterior portion of the sphincter.  The attachments between the sphincter and the anterior adenoma were taken down with the laser fiber until we met the anterior plan.  We then connected all planes circumferentially.  We continued the enucleation circumferentially until we reached the bladder neck.  The bladder neck was entered anteriorly and good hemostasis was obtained.  Then the bladder neck incision was developed circumferentially around the adenoma.  Adenoma was then rolled into the bladder and residual posterior attachments were removed.    Hemostasis was performed.The laser bridge was removed and the nephroscope loaded with the Piranha morcellator was inserted.  2 irrigations were connected to distended bladder.  Tissue was completely morcellated.    The laser bridge was inserted again and  meticulous hemostasis was performed.  I verified that there was no residual tissue in the bladder, and that ureteral orifices were free.    The laser apparatus was removed and a 22 Setswana three-way catheter was inserted and connected to irrigation.    Patient was extubated and moved to the postoperative area in stable condition.         Disposition: patient will have a trial of void on postoperative day one    Complications:  None; patient tolerated the procedure well.    Disposition: PACU - hemodynamically stable.  Condition: stable         Additional Details:     Attending Attestation: I was present and scrubbed for the entire procedure.    Millicent Balbuena  Phone Number: [phone]

## 2024-07-22 NOTE — PERIOPERATIVE NURSING NOTE
Pt awake, repositioned for comfort, tolerating ice chips per protocol.  Pt denies pain or PONV, abdomen assessment remains neg. CBI continuing, pink clear.

## 2024-07-22 NOTE — ANESTHESIA POSTPROCEDURE EVALUATION
Patient: Cl Feliz    Procedure Summary       Date: 07/22/24 Room / Location: [facility] OR 02 / Virtual [facility]    Anesthesia Start: 0709 Anesthesia Stop: 0850    Procedures:       HoLEP      Cystolitholapaxy (  Morcellator, HoLEP set , P - 120 ) Diagnosis:       BPH with obstruction/lower urinary tract symptoms      Bladder stones      (BPH with obstruction/lower urinary tract symptoms [N40.1, N13.8])      (Bladder stones [N21.0])    Surgeons: Millicent Balbuena MD Responsible Provider: Gabbi Boone MD MPH    Anesthesia Type: general ASA Status: 3            Anesthesia Type: general    Vitals Value Taken Time   /71 07/22/24 0915   Temp 35.6 °C (96.1 °F) 07/22/24 0847   Pulse 63 07/22/24 0915   Resp 14 07/22/24 0915   SpO2 94 % 07/22/24 0915       Anesthesia Post Evaluation    Patient location during evaluation: PACU  Patient participation: complete - patient participated  Level of consciousness: awake and alert  Pain score: 0  Pain management: adequate  Multimodal analgesia pain management approach  Airway patency: patent  Two or more strategies used to mitigate risk of obstructive sleep apnea  Cardiovascular status: acceptable  Respiratory status: acceptable  Hydration status: acceptable  Postoperative Nausea and Vomiting: none    No notable events documented.

## 2024-07-22 NOTE — ANESTHESIA PREPROCEDURE EVALUATION
Patient: Cl Feliz    Procedure Information       Date/Time: 07/22/24 0700    Procedures:       HoLEP      Cystolitholapaxy (  Morcellator, HoLEP set , P - 120 )    Location: [facility] 02 / [facility]    Surgeons: Millicent Balbuena MD            Relevant Problems   Anesthesia (within normal limits)      Cardiac  Nuclear stress 6/1/23 - neg for ischemia, nl LVEF   (+) HTN (hypertension)   (+) Hyperlipidemia   (+) PVD (peripheral vascular disease) (CMS-HCC)      Pulmonary   (+) WES (obstructive sleep apnea)      Neuro   (+) Lumbar radiculopathy      GI   (+) GERD (gastroesophageal reflux disease)      /Renal   (+) BPH with obstruction/lower urinary tract symptoms      Liver (within normal limits)      Endocrine   (+) Class 1 obesity with body mass index (BMI) of 31.0 to 31.9 in adult      Hematology (within normal limits)      Musculoskeletal   (+) Cervical spinal stenosis   (+) Lumbar stenosis      HEENT (within normal limits)      ID   (+) Acute infective otitis externa      Skin   (+) Eczema of external ear, bilateral   (+) Pityriasis rosea      GYN (within normal limits)     Past Surgical History:   Procedure Laterality Date   • CARDIAC CATHETERIZATION      x 2   • OTHER SURGICAL HISTORY  07/29/2019    Neck surgery   • VASECTOMY  01/28/2016    Surgery Vas Deferens Vasectomy       Clinical information reviewed:   Tobacco  Allergies  Meds   Med Hx  Surg Hx   Fam Hx  Soc Hx        NPO Detail:  NPO/Void Status  Date of Last Liquid: 07/21/24  Time of Last Liquid: 2100  Date of Last Solid: 07/21/24  Time of Last Solid: 1900  Time of Last Void: 0430           Chemistry    Lab Results   Component Value Date/Time     07/08/2024 1003    K 5.1 07/08/2024 1003     07/08/2024 1003    CO2 25 07/08/2024 1003    BUN 19 07/08/2024 1003    CREATININE 1.10 07/08/2024 1003    Lab Results   Component Value Date/Time    CALCIUM 9.8 07/08/2024 1003    ALKPHOS 58 05/20/2024 1205    AST 18 05/20/2024 1205    ALT 29  05/20/2024 1205    BILITOT 0.8 05/20/2024 1205        Lab Results   Component Value Date    WBC 8.9 07/08/2024    HGB 14.5 07/08/2024    HCT 44.5 07/08/2024    MCV 98 07/08/2024     07/08/2024       Physical Exam    Airway  Mallampati: I  TM distance: >3 FB  Neck ROM: full     Cardiovascular - normal exam     Dental   (+) upper dentures     Pulmonary - normal exam     Abdominal - normal exam         Anesthesia Plan    History of general anesthesia?: yes  History of complications of general anesthesia?: no    ASA 3     general     The patient is not a current smoker.  Patient was not previously instructed to abstain from smoking on day of procedure.  Patient did not smoke on day of procedure.  Education provided regarding risk of obstructive sleep apnea.  intravenous induction   Postoperative administration of opioids is intended.  Trial extubation is planned.  Anesthetic plan and risks discussed with patient and spouse.  Use of blood products discussed with patient and spouse who consented to blood products.    Plan discussed with CRNA and CAA.

## 2024-07-22 NOTE — DISCHARGE INSTRUCTIONS
Diet  You can eat whatever you like after your surgery. Sometimes the anesthesia can cause nausea, so it may be a good idea to stay away from heavy foods right after you get home from the procedure.    Mazariegos catheter  You will have a tube in the bladder called a mazariegos catheter. This drains urine from the bladder and exits the penis. Be sure the catheter is well secured to the leg at all times. This catheter typically stays in from one day to a week (7 days) depending on your circumstances. There should never be any tension or tugging on the catheter. Take care not to pull on the catheter when rolling in bed or changing position. The nurses will show you how to attach the mazariegos catheter to a leg bag during the day and a big bag at night.    The mazariegos catheter has a balloon on the end of it to keep it in place in the bladder. This may give you the feeling you need to urinate. Be assured the catheter is draining and the sensation is from the mazariegos catheter balloon. You may also notice urine or blood-tinged urine leaking around the catheter out the tip of the penis. Typically, this due to a bladder spasm and is not a cause for alarm. If the catheter stops draining, get up and walk around. If it is still not draining, call the office or come to the emergency room as it may be clogged and need to be irrigated. Once the mazariegos catheter is removed, it is normal to have burning and stinging with urination for a few weeks after surgery. It is also common to have more frequent urination and a greater sense of the urge to urinate. There may not be much warning from the time you feel the urge to urinate to the time when the bladder is ready to empty.    Activity  It is very important to walk after your procedure. Walking prevents blood clots in the legs or lungs. You may go up and down stairs. Avoid any strenuous activity or lifting more than ten pounds for 3 to 4 weeks. This includes any heavy lifting, running, riding a bicycle  or golf. This also includes activities such as raking leaves, mowing the lawn, shoveling snow or other strenuous chores. If you see blood in the urine, increase the amount of water you are drinking and avoid strenuous activity or heavy lifting until the blood clears.    Medications  Take the medications prescribed at the time of your discharge from the hospital. If you are taking any medications on a regular basis prior to your admission to the hospital, you should continue to take those as well. For any aches, pains or headaches, you may use Tylenol or Extra-Strength Tylenol. Sometimes, you will be given a prescription for other pain killers. Do not use any aspirin or aspirin-like compounds or ibuprofen products (NSAIDs) (eg. Advil, Nuprin, Motrin, Bufferin, etc.) for four weeks after surgery. If you start aspirin or aspirin like compounds and you notice blood in your urine, please stop taking it and increase your water intake. Pyridium will be called in that will help with burning.  It will color your urine orange.  Antibiotic will be also prescribed for 1 week.    Avoid constipation  Anesthesia, surgery and narcotic pain medication all increase your risk for constipation. Do not strain to move the bowels as this can impair the healing process and start bleeding. Take plenty of fiber, water and over the counter stool softener to avoid constipation. Stool softener can be taken by mouth twice a day to avoid constipation. A stool softener or laxative is available at any drug store without a prescription (senna or Senokot or SennaGen, Dulcolax or bisacodyl, Miralax, Metamucil, Milk of Magnesia or magnesium hydroxide). Decrease or hold the stool softener for diarrhea or loose stools.    Follow up plan  If you develop a fever greater than 101 degrees Fahrenheit, the catheter stops draining or you are unable to urinate, call the office or come to the emergency room.  Your catheter removal has been scheduled  Please call  [phone] and follow prompts for Dr. Balbuena's  if you are not sure of your appointment time or location

## 2024-07-22 NOTE — ANESTHESIA PROCEDURE NOTES
Airway  Date/Time: 7/22/2024 7:17 AM  Urgency: elective    Airway not difficult    Staffing  Performed: FELIPE   Authorized by: Gabib Boone MD MPH    Performed by: FELIPE Lopez  Patient location during procedure: OR    Indications and Patient Condition  Indications for airway management: anesthesia and airway protection  Spontaneous ventilation: present  Sedation level: deep  Preoxygenated: yes  Patient position: sniffing  Mask difficulty assessment: 2 - vent by mask + OA or adjuvant +/- NMBA  Planned trial extubation    Final Airway Details  Final airway type: endotracheal airway      Successful airway: ETT  Cuffed: yes   Successful intubation technique: direct laryngoscopy  Facilitating devices/methods: intubating stylet  Endotracheal tube insertion site: oral  Blade: Aurora  Blade size: #3  ETT size (mm): 7.5  Cormack-Lehane Classification: grade I - full view of glottis  Placement verified by: chest auscultation and capnometry   Cuff volume (mL): 8  Measured from: lips  ETT to lips (cm): 23  Number of attempts at approach: 1    Additional Comments  EASY ETT PLACEMENT  ATRAUMATIC  SOFT BITE BLOCK PLACED

## 2024-07-23 ENCOUNTER — APPOINTMENT (OUTPATIENT)
Dept: UROLOGY | Facility: CLINIC | Age: 79
End: 2024-07-23
Payer: MEDICARE

## 2024-07-23 ENCOUNTER — OFFICE VISIT (OUTPATIENT)
Dept: PRIMARY CARE | Facility: CLINIC | Age: 79
End: 2024-07-23
Payer: MEDICARE

## 2024-07-23 VITALS
WEIGHT: 225.8 LBS | HEIGHT: 71 IN | HEART RATE: 81 BPM | DIASTOLIC BLOOD PRESSURE: 50 MMHG | OXYGEN SATURATION: 93 % | SYSTOLIC BLOOD PRESSURE: 106 MMHG | BODY MASS INDEX: 31.61 KG/M2

## 2024-07-23 DIAGNOSIS — N40.1 BPH WITH OBSTRUCTION/LOWER URINARY TRACT SYMPTOMS: Primary | ICD-10-CM

## 2024-07-23 DIAGNOSIS — G95.9 CERVICAL MYELOPATHY (MULTI): ICD-10-CM

## 2024-07-23 DIAGNOSIS — N13.8 BPH WITH OBSTRUCTION/LOWER URINARY TRACT SYMPTOMS: Primary | ICD-10-CM

## 2024-07-23 PROCEDURE — 1123F ACP DISCUSS/DSCN MKR DOCD: CPT | Performed by: PHYSICIAN ASSISTANT

## 2024-07-23 PROCEDURE — 3074F SYST BP LT 130 MM HG: CPT | Performed by: STUDENT IN AN ORGANIZED HEALTH CARE EDUCATION/TRAINING PROGRAM

## 2024-07-23 PROCEDURE — 1160F RVW MEDS BY RX/DR IN RCRD: CPT | Performed by: STUDENT IN AN ORGANIZED HEALTH CARE EDUCATION/TRAINING PROGRAM

## 2024-07-23 PROCEDURE — 1123F ACP DISCUSS/DSCN MKR DOCD: CPT | Performed by: STUDENT IN AN ORGANIZED HEALTH CARE EDUCATION/TRAINING PROGRAM

## 2024-07-23 PROCEDURE — 1159F MED LIST DOCD IN RCRD: CPT | Performed by: STUDENT IN AN ORGANIZED HEALTH CARE EDUCATION/TRAINING PROGRAM

## 2024-07-23 PROCEDURE — 3078F DIAST BP <80 MM HG: CPT | Performed by: STUDENT IN AN ORGANIZED HEALTH CARE EDUCATION/TRAINING PROGRAM

## 2024-07-23 PROCEDURE — 99214 OFFICE O/P EST MOD 30 MIN: CPT | Performed by: STUDENT IN AN ORGANIZED HEALTH CARE EDUCATION/TRAINING PROGRAM

## 2024-07-23 PROCEDURE — 99024 POSTOP FOLLOW-UP VISIT: CPT | Performed by: PHYSICIAN ASSISTANT

## 2024-07-23 PROCEDURE — 51700 IRRIGATION OF BLADDER: CPT | Performed by: PHYSICIAN ASSISTANT

## 2024-07-23 ASSESSMENT — ENCOUNTER SYMPTOMS
ALLERGIC/IMMUNOLOGIC NEGATIVE: 1
ENDOCRINE NEGATIVE: 1
EYES NEGATIVE: 1
HEMATOLOGIC/LYMPHATIC NEGATIVE: 1
NEUROLOGICAL NEGATIVE: 1
MUSCULOSKELETAL NEGATIVE: 1
CARDIOVASCULAR NEGATIVE: 1
GASTROINTESTINAL NEGATIVE: 1
CONSTITUTIONAL NEGATIVE: 1
RESPIRATORY NEGATIVE: 1
PSYCHIATRIC NEGATIVE: 1

## 2024-07-23 NOTE — ED PROVIDER NOTES
HPI   Chief Complaint   Patient presents with    Urinary Retention     Patient had prostate surgery this morning and now his catheter is not draining.       Patient is a 78-year-old male presents emergency department for evaluation of catheter not draining.  Patient had a procedure done this morning with his urologist for removal of bladder stones as well as procedure on his prostate.  He states that he left with a catheter in place.  He has been to be starting antibiotics as well, but has not picked those up.  He states has been having difficulties with his catheter.  He states that it was intermittently not draining, but now has not been draining at all.  He is concerned it may be clogged he is never had a catheter before.  He denies any abdominal pain, fevers, chills, lightheadedness, dizziness, nausea, vomiting.      History provided by:  Patient   used: No            Patient History   Past Medical History:   Diagnosis Date    Atherosclerosis     Bladder stones 06/11/2024    BPH (benign prostatic hyperplasia)     Cervical spinal stenosis 10/10/2023    GERD (gastroesophageal reflux disease) 06/20/2023    Gout 06/20/2023    HLD (hyperlipidemia)     HTN (hypertension)     Lumbar stenosis 06/20/2023    Personal history of other endocrine, nutritional and metabolic disease 01/28/2016    History of hypoglycemia    PVD (peripheral vascular disease) (CMS-HCC) 06/20/2023    Sleep apnea     Supraventricular tachycardia (CMS-HCC) 06/20/2023     Past Surgical History:   Procedure Laterality Date    CARDIAC CATHETERIZATION      x 2    OTHER SURGICAL HISTORY  07/29/2019    Neck surgery    VASECTOMY  01/28/2016    Surgery Vas Deferens Vasectomy     Family History   Problem Relation Name Age of Onset    Breast cancer Mother      Accidental death Father      No Known Problems Sister      Hypertension Brother       Social History     Tobacco Use    Smoking status: Never    Smokeless tobacco: Never   Vaping Use     Vaping status: Never Used   Substance Use Topics    Alcohol use: Yes     Comment: RARELY    Drug use: Never       Physical Exam   ED Triage Vitals [07/22/24 2207]   Temperature Heart Rate Respirations BP   37 °C (98.6 °F) 85 18 118/68      Pulse Ox Temp Source Heart Rate Source Patient Position   94 % Oral Monitor Sitting      BP Location FiO2 (%)     Right arm --       Physical Exam  Constitutional:       Appearance: Normal appearance.   Cardiovascular:      Rate and Rhythm: Normal rate and regular rhythm.   Pulmonary:      Effort: Pulmonary effort is normal.      Breath sounds: Normal breath sounds.   Abdominal:      General: Abdomen is flat.      Palpations: Abdomen is soft.      Tenderness: There is no abdominal tenderness.   Genitourinary:     Comments: Indwelling three-way catheter in place with some dried blood around urethra.  Musculoskeletal:         General: Normal range of motion.   Skin:     General: Skin is warm and dry.   Neurological:      General: No focal deficit present.      Mental Status: He is alert and oriented to person, place, and time.           ED Course & MDM   Diagnoses as of 07/23/24 0004   Urinary catheter complication, initial encounter (CMS-HCC)                       [place] Coma Scale Score: 15                        Medical Decision Making  Patient is a 78-year-old male presents emergency department for evaluation of catheter not draining.    Labwork and scans not warranted at today's visit.      Medications not given at today's visit.      I saw this patient independently.  Catheter was irrigated by nursing staff and dislodge small blood clot.  Patient had free-flowing urine at this time and no longer had obstructed catheter.  Given that catheter is now working, patient is stable to be discharged follow-up closely outpatient with urology.  Suspect that catheter was clogged today by blood clot given recent surgery.  He is already on antibiotics and has close follow-up with  urology moving forward.  No indication for further imaging or labs at this time.  He is otherwise well-appearing with stable vital signs.  Emergent pathologies were considered for this patient, although I have low suspicion for anything acutely emergent given patient's clinical presentation, history, physical exam, stable vital signs, and relatively unremarkable workup.  Discharging patient home is reasonable plan of care for outpatient management.    Patient was counseled on clinical impression, expectations, and plan.  Patient was educated to follow-up with PCP in the following 1-2 days.  All questions from patient were answered. They elicited understanding and were agreeable to course of treatment.  Patient was discharged in stable condition and given strict return precautions.    ** Disclaimer:  Parts of this document were written utilizing a voice to text dictation software.  Note may contain minor transcription or typographical errors that were inadvertently transcribed by the computer software.        Procedure  Procedures     Naila Schmitt PA-C  07/23/24 0005

## 2024-07-23 NOTE — PROGRESS NOTES
Carotid ultrasound was normal   Dr Francoise Castaneda patient Subjective   Patient ID: Cl Feliz is a 78 y.o. male who presents for No chief complaint on file..  HPI  Patient is a 79 yo male with BPH with LUTS s/p HoLEP with Dr Ballard on 2/22/24 presents for TOV.    Patient was in the Ed last night because catheter was clogged and needed to be irrigated. Small blood clot was removed.   He denies fever or chills/     Voiding trial passed. Catheter removed intact.     Review of Systems   Constitutional: Negative.    HENT: Negative.     Eyes: Negative.    Respiratory: Negative.     Cardiovascular: Negative.    Gastrointestinal: Negative.    Endocrine: Negative.    Genitourinary: Negative.    Musculoskeletal: Negative.    Skin: Negative.    Allergic/Immunologic: Negative.    Neurological: Negative.    Hematological: Negative.    Psychiatric/Behavioral: Negative.         Objective   Physical Exam  Constitutional:       General: He is not in acute distress.     Appearance: Normal appearance.   HENT:      Head: Normocephalic and atraumatic.      Nose: Nose normal.      Mouth/Throat:      Mouth: Mucous membranes are moist.   Pulmonary:      Effort: Pulmonary effort is normal.   Genitourinary:     Penis: Normal.    Musculoskeletal:      Cervical back: Normal range of motion.   Neurological:      Mental Status: He is alert.         Assessment/Plan     status post PVP HolEP on 7/15/2021 with Dr. Balbuena    Reviewed normal expectations and restrictions after surgery. Please limit heavy activity as this can increase your risk for bleeding (running, cycling, lifting, riding a ). Avoid constipation. Do not have relations for the next two weeks. Avoid constipation. Initiate kegel exercises    Advised that you may have burning but that if it seems to resolve and then return to call the office. The pyridium that you were prescribed can be taken if you continue to have burning. However if you burning stops and restarts please call the office so that we can rule out an infection Please  do not have relations for the next two weeks.     Restart Aspirin 3 days after urine is visibly clear I advised increasing fluid intake to reduce irritation.  If patient is not able to urinate I advised earlier follow-up or to go to the ER for Shaw catheter placement.         Neville Kohler PA-C 07/23/24 2:47 PM

## 2024-07-23 NOTE — DISCHARGE INSTRUCTIONS
Please follow-up closely with PCP in the following 1 to 2 days.  Follow-up closely with urology as previously arranged.  Take and complete antibiotics as previously prescribed.

## 2024-07-23 NOTE — TELEPHONE ENCOUNTER
Patient called twice on the evening of 7/22 regarding issues with his catheter.     Initially stated the stat lock became unclipped and was inquiring about how to reattach it. This was done successfully with instructions over the phone and the patient endorsed that his mazariegos was still draining at this time.    The patient then called the answering service again stating his mazariegos was no longer draining. Endorsed it being attached to the stat lock and that it appeared to be in the same position as before. Denies pulling on it or getting it caught. Endorsed a feeling of his bladder being full and unable to drain.     The patient was then urged to present to the emergency room to be evaluated for correct positioning of the mazariegos catheter.     Sonu Fernandez MD  Urology - PGY2  Pager [phone]; Peds pager [phone]

## 2024-07-26 LAB
APPEARANCE STONE: NORMAL
COMPN STONE: NORMAL
SPECIMEN WT: 197 MG

## 2024-07-29 NOTE — PROGRESS NOTES
Subjective   Patient ID: Cl Feliz is a 78 y.o. male who presents for Follow-up (Pt is here for an ED follow up. Pt stated he was there due to catheter problems. Catheter has since been removed.).    HPI  status post PVP HolEP on 7/15/2021 with Dr. Balbuena   Patient did have a postop complication of Shaw blockage due to a clot.  He presented to the emergency room for flushing which was successful.  Patient has since had his Shaw catheter removed.  He has been able to urinate without issue since the Shaw catheter removal earlier today      Objective   Physical Exam  Vitals reviewed.   Constitutional:       Appearance: Normal appearance.   Cardiovascular:      Rate and Rhythm: Normal rate and regular rhythm.      Heart sounds: No murmur heard.  Pulmonary:      Effort: Pulmonary effort is normal. No respiratory distress.      Breath sounds: Normal breath sounds. No wheezing.   Musculoskeletal:      Cervical back: Neck supple.      Left lower leg: No edema.   Neurological:      Mental Status: He is alert.         Assessment/Plan   Diagnoses and all orders for this visit:  Cervical myelopathy (Multi)    Postoperative complications  Patient is status post PVP HolEP on 7/15/2021 with Dr. Balbuena  for his BPH with obstruction  Patient had a Shaw blockage due to a clot which was flushed at the emergency department on 7/22/2024  Patient's Shaw was removed earlier today with urology.  He has had no complications with removal.  Patient has successfully voided since his visit earlier today.  Patient has a follow-up scheduled with urology in October 2024       An Gutiérrez DO 07/29/24 7:38 PM

## 2024-07-31 DIAGNOSIS — I10 HYPERTENSION, UNSPECIFIED TYPE: ICD-10-CM

## 2024-07-31 RX ORDER — LISINOPRIL 20 MG/1
20 TABLET ORAL DAILY
Qty: 90 TABLET | Refills: 0 | Status: SHIPPED | OUTPATIENT
Start: 2024-07-31

## 2024-08-02 LAB
LAB AP ASR DISCLAIMER: NORMAL
LABORATORY COMMENT REPORT: NORMAL
PATH REPORT.COMMENTS IMP SPEC: NORMAL
PATH REPORT.FINAL DX SPEC: NORMAL
PATH REPORT.GROSS SPEC: NORMAL
PATH REPORT.RELEVANT HX SPEC: NORMAL
PATH REPORT.TOTAL CANCER: NORMAL

## 2024-08-16 ENCOUNTER — APPOINTMENT (OUTPATIENT)
Dept: UROLOGY | Facility: CLINIC | Age: 79
End: 2024-08-16
Payer: MEDICARE

## 2024-08-16 DIAGNOSIS — C61 MALIGNANT NEOPLASM OF PROSTATE (MULTI): Primary | ICD-10-CM

## 2024-08-16 DIAGNOSIS — K59.09 OTHER CONSTIPATION: ICD-10-CM

## 2024-08-16 PROCEDURE — 1123F ACP DISCUSS/DSCN MKR DOCD: CPT | Performed by: UROLOGY

## 2024-08-16 PROCEDURE — 99214 OFFICE O/P EST MOD 30 MIN: CPT | Performed by: UROLOGY

## 2024-08-16 NOTE — PROGRESS NOTES
Scribed for Dr. Millicent Balbuena by Jeremie Shha.  I, Dr. Millicent Balbuena, have personally reviewed and agreed with the information entered by the Virtual Scribe. 08/16/24    This visit was completed via telemedicine. All issues as below were discussed and addressed but no physical exam was performed unless allowed by visual confirmation. If it was felt that the patient should be evaluated in clinic, then they were directed there. Patient verbal consented to the visit.    History of Present Illness:  TODAY: (08/16/24)  Cl Feliz is a 78 y.o. male with history of BPH (55g), nephrolithiasis, bladder stones, and renal cysts. S/p cystoscopy with me (06/11/24). Last visit, discussed plan for HoLEP followed by stone intervention afterwards. Now s/p HoLEP + cystolitholapaxy with me (07/22/24), pathology revealed incidental [place] 6 prostate cancer. Pre-op PSA 3.0, no previous MRI imaging.     Presents virtually for a post-op visit and pathology results.   Pathology results reviewed with patient.   He reports he has been doing well overall.   Remains satisfied with results of HoLEP.   States his stream is much improved and has been emptying well.     TO REVIEW:   IMPRESSION:  1.  Multiple bilateral nonobstructing renal calculi, the largest on  the right measures 10 mm. No hydronephrosis.  2. 12 mm posterior urinary bladder wall calculus.  3. Bilateral simple renal cysts. No suspicious mass or abnormal  enhancement.  4. 10 mm left adrenal adenomas, stable since 2018.  5. Severe atherosclerotic calcifications.    Prostate from CT:    width is 50mm, height is 4.7mm, length is 4.5mm.   Total prostate volume is 55cc's.    Past Medical History:   Diagnosis Date    Atherosclerosis     Bladder stones 06/11/2024    BPH (benign prostatic hyperplasia)     Cervical spinal stenosis 10/10/2023    GERD (gastroesophageal reflux disease) 06/20/2023    Gout 06/20/2023    HLD (hyperlipidemia)     HTN (hypertension)     Lumbar stenosis 06/20/2023     Personal history of other endocrine, nutritional and metabolic disease 01/28/2016    History of hypoglycemia    PVD (peripheral vascular disease) (CMS-HCC) 06/20/2023    Sleep apnea     Supraventricular tachycardia (CMS-HCC) 06/20/2023     Past Surgical History:   Procedure Laterality Date    CARDIAC CATHETERIZATION      x 2    OTHER SURGICAL HISTORY  07/29/2019    Neck surgery    VASECTOMY  01/28/2016    Surgery Vas Deferens Vasectomy     Family History   Problem Relation Name Age of Onset    Breast cancer Mother      Accidental death Father      No Known Problems Sister      Hypertension Brother       Social History     Tobacco Use   Smoking Status Never   Smokeless Tobacco Never     Current Outpatient Medications   Medication Sig Dispense Refill    allopurinol (Zyloprim) 300 mg tablet Take 1 tablet (300 mg) by mouth once daily. 90 tablet 3    aspirin 81 mg EC tablet Take 1 tablet (81 mg) by mouth once daily.      atorvastatin (Lipitor) 40 mg tablet Take 1 tablet (40 mg) by mouth once daily.      calcium polycarbophil (FIBERCON ORAL) Take 2 tablets by mouth once daily at bedtime.      cholecalciferol (Vitamin D-3) 25 MCG (1000 UT) capsule Take 2 capsules (50 mcg) by mouth once daily.      coenzyme Q-10 100 mg capsule Take by mouth.      cyclobenzaprine (Flexeril) 10 mg tablet Take 1 tablet (10 mg) by mouth 2 times a day as needed for muscle spasms. 30 tablet 0    fluocinolone (DermOtic) 0.01 % ear drops Instill 5 drops in the affected ear once a day as needed for itching 20 mL 3    fluoride, sodium, (Prevident 5000 Booster) 1.1 % dental paste BRUSH TWICE A DAY. NO EATING / RINSING / DRINKING FOR 30 MINUTES AFTER      lisinopril 20 mg tablet take 1 tablet by mouth every day 90 tablet 0    meloxicam (Mobic) 15 mg tablet TAKE ONE TABLET BY MOUTH EVERY DAY AS NEEDED 90 tablet 0    multivit-min/folic acid/vit K1 (MULTI FOR HIM, NO IRON, ORAL) Take by mouth.      omeprazole (PriLOSEC) 20 mg DR capsule Take 1 capsule  (20 mg) by mouth once daily.      sildenafil (Viagra) 50 mg tablet TAKE 1 TABLET BY MOUTH EVERY DAY AS NEEDED APPROXIMATELY 1 HOUR BEFORE SEXUAL ACTIVITY      triamcinolone (Kenalog) 0.1 % ointment apply twice daily to affected areas for no more than 2 weeks per month.       No current facility-administered medications for this visit.     Allergies   Allergen Reactions    Adhesive Tape-Silicones Rash    Penicillins Other     Childhood allergy     Past medical, surgical, family and social history in the chart was reviewed and is accurate including any additions to what is in this HPI.    Review of systems:   Pertinent information as listed in the HPI.        Objective   There were no vitals taken for this visit.  Physical Exam:  Exam limited 2/2 being a telehealth visit.     Lab Review:  Lab Results   Component Value Date    WBC 8.9 07/08/2024    RBC 4.55 07/08/2024    HGB 14.5 07/08/2024    HCT 44.5 07/08/2024     07/08/2024      Lab Results   Component Value Date    BUN 19 07/08/2024    CREATININE 1.10 07/08/2024      Lab Results   Component Value Date    HGBA1C 5.6 05/26/2023     Lab Results   Component Value Date    CHOL 137 05/26/2023    TRIG 136 05/26/2023    HDL 39.1 (A) 05/26/2023    ALT 29 05/20/2024    AST 18 05/20/2024     07/08/2024    K 5.1 07/08/2024     07/08/2024    CO2 25 07/08/2024    TSH 1.95 05/26/2023    INR 1.0 07/08/2024        ASSESSMENT:  Problem List Items Addressed This Visit    None     PLAN:  #BPH (55g)  #Bladder stones  #Prostate cancer  s/p HoLEP + cystolitholapaxy with me (07/22/24);  Pathology revealed incidental [place] 6 prostate cancer.   Pre-op PSA 3.0, no previous MRI imaging.     Elects to proceed with a post-op PSA and prostate MRI.   Schedule follow up to review results.     #Nephrolithiasis  Revisit options next visit.     #Constipation  Recommended increasing his daily fluids and daily miralax for prevention.     All questions were answered to the  patient’s satisfaction.  Patient agrees with the plan and wishes to proceed.  Continue follow-up for ongoing care of his chronic medical conditions.    Scribed for Dr. Millicent Balbuena by Jeremie Shah.  I, Dr. Millicent Balbuena, have personally reviewed and agreed with the information entered by the Virtual Scribe. 08/16/24

## 2024-08-29 DIAGNOSIS — M1A.0510: ICD-10-CM

## 2024-08-29 DIAGNOSIS — E78.5 HYPERLIPIDEMIA, UNSPECIFIED HYPERLIPIDEMIA TYPE: Primary | ICD-10-CM

## 2024-08-29 RX ORDER — MELOXICAM 15 MG/1
15 TABLET ORAL DAILY PRN
Qty: 90 TABLET | Refills: 0 | Status: SHIPPED | OUTPATIENT
Start: 2024-08-29

## 2024-08-29 RX ORDER — ATORVASTATIN CALCIUM 40 MG/1
40 TABLET, FILM COATED ORAL DAILY
Qty: 90 TABLET | Refills: 0 | Status: SHIPPED | OUTPATIENT
Start: 2024-08-29

## 2024-10-07 ENCOUNTER — OFFICE VISIT (OUTPATIENT)
Dept: URGENT CARE | Age: 79
End: 2024-10-07
Payer: MEDICARE

## 2024-10-07 VITALS
TEMPERATURE: 98.8 F | RESPIRATION RATE: 20 BRPM | HEART RATE: 94 BPM | DIASTOLIC BLOOD PRESSURE: 79 MMHG | OXYGEN SATURATION: 95 % | SYSTOLIC BLOOD PRESSURE: 149 MMHG

## 2024-10-07 DIAGNOSIS — K59.09 CHRONIC CONSTIPATION: Primary | ICD-10-CM

## 2024-10-07 RX ORDER — LACTULOSE 10 G/15ML
15 SOLUTION ORAL; RECTAL DAILY PRN
Qty: 161 ML | Refills: 0 | Status: SHIPPED | OUTPATIENT
Start: 2024-10-07 | End: 2024-10-14

## 2024-10-07 ASSESSMENT — PAIN SCALES - GENERAL: PAINLEVEL: 3

## 2024-10-07 NOTE — PROGRESS NOTES
Chief Complaint   Patient presents with    Constipation     4-5 days no bowel movement. Has taken stool softners/laxatives/fiber. LRQ soreness and back soreness. Had prostate surgery- some incontinence of urine.       Physical Exam:   GEN: Awake and alert, No acute distress     GI: Abdomen soft, non-tender, non-distended.       Encounter Diagnosis   Name Primary?    Chronic constipation Yes        Plan:     Rx: lactulose     OTC dulcolax suppository    Deann Winslow, DO

## 2024-10-09 ENCOUNTER — HOSPITAL ENCOUNTER (OUTPATIENT)
Dept: RADIOLOGY | Facility: HOSPITAL | Age: 79
Discharge: HOME | End: 2024-10-09
Payer: MEDICARE

## 2024-10-09 DIAGNOSIS — C61 MALIGNANT NEOPLASM OF PROSTATE (MULTI): ICD-10-CM

## 2024-10-09 PROCEDURE — A9575 INJ GADOTERATE MEGLUMI 0.1ML: HCPCS | Performed by: UROLOGY

## 2024-10-09 PROCEDURE — 76498 UNLISTED MR PROCEDURE: CPT

## 2024-10-09 PROCEDURE — 2550000001 HC RX 255 CONTRASTS: Performed by: UROLOGY

## 2024-10-09 PROCEDURE — 72197 MRI PELVIS W/O & W/DYE: CPT

## 2024-10-09 RX ORDER — GADOTERATE MEGLUMINE 376.9 MG/ML
20 INJECTION INTRAVENOUS
Status: COMPLETED | OUTPATIENT
Start: 2024-10-09 | End: 2024-10-09

## 2024-10-15 NOTE — PROGRESS NOTES
Subjective   Cl Feliz is a 78 y.o. male with history of BPH and bladder stones s/p HoLEP with concurrent cystolitholapaxy on 07/22/24, Neo 6 prostate cancer, nephrolithiasis, and renal cysts. Patient presents today for a follow up visit. Patient has occasional residual urinary urgency and frequency which is improving.           Past Medical History:   Diagnosis Date    Atherosclerosis     Bladder stones 06/11/2024    BPH (benign prostatic hyperplasia)     Cervical spinal stenosis 10/10/2023    GERD (gastroesophageal reflux disease) 06/20/2023    Gout 06/20/2023    HLD (hyperlipidemia)     HTN (hypertension)     Lumbar stenosis 06/20/2023    Personal history of other endocrine, nutritional and metabolic disease 01/28/2016    History of hypoglycemia    PVD (peripheral vascular disease) (CMS-HCC) 06/20/2023    Sleep apnea     Supraventricular tachycardia (CMS-HCC) 06/20/2023    Vasovagal syncope      Past Surgical History:   Procedure Laterality Date    CARDIAC CATHETERIZATION      x 2    OTHER SURGICAL HISTORY  07/29/2019    Neck surgery    PROSTATE SURGERY      VASECTOMY  01/28/2016    Surgery Vas Deferens Vasectomy     Family History   Problem Relation Name Age of Onset    Breast cancer Mother      Accidental death Father      No Known Problems Sister      Hypertension Brother       Current Outpatient Medications   Medication Sig Dispense Refill    allopurinol (Zyloprim) 300 mg tablet Take 1 tablet (300 mg) by mouth once daily. 90 tablet 3    aspirin 81 mg EC tablet Take 1 tablet (81 mg) by mouth once daily.      atorvastatin (Lipitor) 40 mg tablet TAKE ONE TABLET BY MOUTH DAILY 90 tablet 0    calcium polycarbophil (FIBERCON ORAL) Take 2 tablets by mouth once daily at bedtime.      cholecalciferol (Vitamin D-3) 25 MCG (1000 UT) capsule Take 2 capsules (50 mcg) by mouth once daily.      coenzyme Q-10 100 mg capsule Take by mouth.      cyclobenzaprine (Flexeril) 10 mg tablet Take 1 tablet (10 mg) by mouth 2  times a day as needed for muscle spasms. 30 tablet 0    fluocinolone (DermOtic) 0.01 % ear drops Instill 5 drops in the affected ear once a day as needed for itching 20 mL 3    fluoride, sodium, (Prevident 5000 Booster) 1.1 % dental paste BRUSH TWICE A DAY. NO EATING / RINSING / DRINKING FOR 30 MINUTES AFTER      lisinopril 20 mg tablet take 1 tablet by mouth every day 90 tablet 0    meloxicam (Mobic) 15 mg tablet take 1 tablet by mouth every day as needed 90 tablet 0    multivit-min/folic acid/vit K1 (MULTI FOR HIM, NO IRON, ORAL) Take by mouth.      omeprazole (PriLOSEC) 20 mg DR capsule Take 1 capsule (20 mg) by mouth once daily.      sildenafil (Viagra) 50 mg tablet TAKE 1 TABLET BY MOUTH EVERY DAY AS NEEDED APPROXIMATELY 1 HOUR BEFORE SEXUAL ACTIVITY      triamcinolone (Kenalog) 0.1 % ointment apply twice daily to affected areas for no more than 2 weeks per month.       No current facility-administered medications for this visit.     Allergies   Allergen Reactions    Adhesive Tape-Silicones Rash    Penicillins Other     Childhood allergy     Social History     Socioeconomic History    Marital status:      Spouse name: Not on file    Number of children: 2    Years of education: Not on file    Highest education level: Not on file   Occupational History    Occupation: Retired   Tobacco Use    Smoking status: Never    Smokeless tobacco: Never   Vaping Use    Vaping status: Never Used   Substance and Sexual Activity    Alcohol use: Yes     Comment: RARELY    Drug use: Never    Sexual activity: Defer   Other Topics Concern    Not on file   Social History Narrative    Not on file     Social Determinants of Health     Financial Resource Strain: Not on file   Food Insecurity: Not on file   Transportation Needs: Not on file   Physical Activity: Not on file   Stress: Not on file   Social Connections: Not on file   Intimate Partner Violence: Not on file   Housing Stability: Not on file       Review of  Systems  Pertinent items are noted in HPI.    Objective       Lab Review  Lab Results   Component Value Date    WBC 8.9 07/08/2024    RBC 4.55 07/08/2024    HGB 14.5 07/08/2024    HCT 44.5 07/08/2024     07/08/2024      Lab Results   Component Value Date    BUN 19 07/08/2024    CREATININE 1.10 07/08/2024   IPSS 10 and 2  PVR=  61ml   Uroflow study demonstrated voided volume of 50 and maximal flow rate of 6 ml/s.     Assessment/Plan   There are no diagnoses linked to this encounter.    BPH and bladder stones s/p HoLEP with concurrent cystolitholapaxy on 07/22/24    Patient's urinary symptoms are not bothersome. We will continue to monitor.     [place] 6 prostate cancer  PI-RADS 4/5 lesion     I reviewed prostate MRI from 10/9/2024 which showed 27g of residual tissue with a PI-RADS 4 lesion is present in the anterior prostate gland ending from base to apex and abutting the anterior fibromuscular stroma. No evidence of gross extracapsular extension or pelvic lymphadenopathy.    We discussed monitoring with PSA and prostate MRI vs. Biopsy. Patient prefers to proceed with monitoring with PSA checks and prostate MRI due to his advanced age.     We will check PSA in 3 months and follow up virtually to review.         All questions were answered to the patient's satisfaction. Patient agrees with the plan and wishes to proceed. Follow-up will be scheduled appropriately.       I spent 30 minutes of dedicated E&M time, including preparation and review of records, notes, and data, time spent with patient/family, and documentation.     E&M visit today is associated with current or anticipated ongoing medical care services related to a patient's single, serious condition or a complex condition.      Scribed for Dr. Balbuena by Zulma Heredia. I , Dr Balbuena, have personally reviewed and agreed with the information entered by the Virtual Scribe.

## 2024-10-16 ENCOUNTER — APPOINTMENT (OUTPATIENT)
Dept: UROLOGY | Facility: CLINIC | Age: 79
End: 2024-10-16
Payer: MEDICARE

## 2024-10-16 VITALS — TEMPERATURE: 97 F

## 2024-10-16 DIAGNOSIS — N40.1 BPH WITH OBSTRUCTION/LOWER URINARY TRACT SYMPTOMS: Primary | ICD-10-CM

## 2024-10-16 DIAGNOSIS — N13.8 BPH WITH OBSTRUCTION/LOWER URINARY TRACT SYMPTOMS: Primary | ICD-10-CM

## 2024-10-16 DIAGNOSIS — C61 MALIGNANT NEOPLASM OF PROSTATE (MULTI): ICD-10-CM

## 2024-10-16 DIAGNOSIS — I10 HYPERTENSION, UNSPECIFIED TYPE: ICD-10-CM

## 2024-10-16 PROCEDURE — 99214 OFFICE O/P EST MOD 30 MIN: CPT | Performed by: UROLOGY

## 2024-10-16 PROCEDURE — 1123F ACP DISCUSS/DSCN MKR DOCD: CPT | Performed by: UROLOGY

## 2024-10-16 PROCEDURE — 51741 ELECTRO-UROFLOWMETRY FIRST: CPT | Performed by: UROLOGY

## 2024-10-16 PROCEDURE — 1159F MED LIST DOCD IN RCRD: CPT | Performed by: UROLOGY

## 2024-10-16 PROCEDURE — 1126F AMNT PAIN NOTED NONE PRSNT: CPT | Performed by: UROLOGY

## 2024-10-16 PROCEDURE — 51798 US URINE CAPACITY MEASURE: CPT | Performed by: UROLOGY

## 2024-10-16 RX ORDER — LISINOPRIL 20 MG/1
20 TABLET ORAL DAILY
Qty: 90 TABLET | Refills: 1 | Status: SHIPPED | OUTPATIENT
Start: 2024-10-16

## 2024-10-16 ASSESSMENT — PAIN SCALES - GENERAL: PAINLEVEL_OUTOF10: 0-NO PAIN

## 2024-10-29 ENCOUNTER — APPOINTMENT (OUTPATIENT)
Dept: UROLOGY | Facility: CLINIC | Age: 79
End: 2024-10-29
Payer: MEDICARE

## 2024-11-20 DIAGNOSIS — M1A.0510: ICD-10-CM

## 2024-11-20 RX ORDER — MELOXICAM 15 MG/1
15 TABLET ORAL DAILY PRN
Qty: 90 TABLET | Refills: 0 | Status: SHIPPED | OUTPATIENT
Start: 2024-11-20

## 2024-11-21 ENCOUNTER — APPOINTMENT (OUTPATIENT)
Dept: OTOLARYNGOLOGY | Facility: CLINIC | Age: 79
End: 2024-11-21
Payer: MEDICARE

## 2024-11-21 VITALS — BODY MASS INDEX: 29.39 KG/M2 | HEIGHT: 72 IN | WEIGHT: 217 LBS

## 2024-11-21 DIAGNOSIS — H61.23 BILATERAL IMPACTED CERUMEN: ICD-10-CM

## 2024-11-21 DIAGNOSIS — H60.543 ECZEMA OF EXTERNAL EAR, BILATERAL: Primary | ICD-10-CM

## 2024-11-21 PROCEDURE — 99213 OFFICE O/P EST LOW 20 MIN: CPT | Performed by: OTOLARYNGOLOGY

## 2024-11-21 PROCEDURE — 69210 REMOVE IMPACTED EAR WAX UNI: CPT | Performed by: OTOLARYNGOLOGY

## 2024-11-21 PROCEDURE — 1160F RVW MEDS BY RX/DR IN RCRD: CPT | Performed by: OTOLARYNGOLOGY

## 2024-11-21 PROCEDURE — 1036F TOBACCO NON-USER: CPT | Performed by: OTOLARYNGOLOGY

## 2024-11-21 PROCEDURE — 1159F MED LIST DOCD IN RCRD: CPT | Performed by: OTOLARYNGOLOGY

## 2024-11-21 PROCEDURE — 1123F ACP DISCUSS/DSCN MKR DOCD: CPT | Performed by: OTOLARYNGOLOGY

## 2024-11-21 RX ORDER — FLUOCINOLONE ACETONIDE 0.11 MG/ML
OIL AURICULAR (OTIC)
Qty: 20 ML | Refills: 3 | Status: SHIPPED | OUTPATIENT
Start: 2024-11-21

## 2024-11-21 NOTE — PROGRESS NOTES
Subjective   Patient ID: Cl Feliz is a 78 y.o. 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  He is complaining of congestion in his ears again.  Review of Systems    Objective   Physical Exam  There is cerumen impaction bilaterally and this was cleared using speculum and curettes.  There is dry squamous debris in the ear canals consistent with eczema and this was also debrided.  The tympanic membranes are clear and mobile.    Ear cerumen removal    Date/Time: 11/21/2024 10:28 AM    Performed by: Natalie Page MD  Authorized by: Natalie Page MD    Consent:     Consent obtained:  Verbal    Risks discussed:  Pain  Procedure details:     Location:  L ear and R ear    Procedure type: curette        Assessment/Plan   Diagnoses and all orders for this visit:  Eczema of external ear, bilateral (Primary)  -     fluocinolone (DermOtic) 0.01 % ear drops; Instill 5 drops in the affected ear once a day as needed for itching  Other orders  -     Ear cerumen removal     Chronic eczema of the external ear canals and recurrent cerumen impaction which was cleaned today. He was advised to continue the Dermotic drops as needed and he will follow-up in 6 months. His prescription was renewed today.

## 2024-12-12 ENCOUNTER — OFFICE VISIT (OUTPATIENT)
Dept: CARDIOLOGY | Facility: CLINIC | Age: 79
End: 2024-12-12
Payer: MEDICARE

## 2024-12-12 VITALS
DIASTOLIC BLOOD PRESSURE: 85 MMHG | BODY MASS INDEX: 30.61 KG/M2 | SYSTOLIC BLOOD PRESSURE: 136 MMHG | OXYGEN SATURATION: 93 % | HEIGHT: 72 IN | HEART RATE: 84 BPM | WEIGHT: 226 LBS

## 2024-12-12 DIAGNOSIS — I10 PRIMARY HYPERTENSION: ICD-10-CM

## 2024-12-12 DIAGNOSIS — E78.2 MIXED HYPERLIPIDEMIA: Primary | ICD-10-CM

## 2024-12-12 PROCEDURE — 3079F DIAST BP 80-89 MM HG: CPT | Performed by: NURSE PRACTITIONER

## 2024-12-12 PROCEDURE — 3075F SYST BP GE 130 - 139MM HG: CPT | Performed by: NURSE PRACTITIONER

## 2024-12-12 PROCEDURE — 99214 OFFICE O/P EST MOD 30 MIN: CPT | Performed by: NURSE PRACTITIONER

## 2024-12-12 PROCEDURE — 1036F TOBACCO NON-USER: CPT | Performed by: NURSE PRACTITIONER

## 2024-12-12 PROCEDURE — 1160F RVW MEDS BY RX/DR IN RCRD: CPT | Performed by: NURSE PRACTITIONER

## 2024-12-12 PROCEDURE — 1159F MED LIST DOCD IN RCRD: CPT | Performed by: NURSE PRACTITIONER

## 2024-12-12 PROCEDURE — 1123F ACP DISCUSS/DSCN MKR DOCD: CPT | Performed by: NURSE PRACTITIONER

## 2024-12-12 PROCEDURE — G2211 COMPLEX E/M VISIT ADD ON: HCPCS | Performed by: NURSE PRACTITIONER

## 2024-12-12 ASSESSMENT — ENCOUNTER SYMPTOMS
MUSCULOSKELETAL NEGATIVE: 1
RESPIRATORY NEGATIVE: 1
DEPRESSION: 0
CARDIOVASCULAR NEGATIVE: 1
NEUROLOGICAL NEGATIVE: 1
CONSTITUTIONAL NEGATIVE: 1
GASTROINTESTINAL NEGATIVE: 1

## 2024-12-12 NOTE — PROGRESS NOTES
"Chief Complaint:   Follow-up    History Of Present Illness:    .Mr Tray returns in follow up.  Denies chest pain, sob, palpitations or pedal edema.           Last Recorded Vitals:  Blood pressure 136/85, pulse 84, height 1.816 m (5' 11.5\"), weight 103 kg (226 lb), SpO2 93%.     Past Medical History:  Past Medical History:   Diagnosis Date    Atherosclerosis     Bladder stones 06/11/2024    BPH (benign prostatic hyperplasia)     Cervical spinal stenosis 10/10/2023    GERD (gastroesophageal reflux disease) 06/20/2023    Gout 06/20/2023    HLD (hyperlipidemia)     HTN (hypertension)     Lumbar stenosis 06/20/2023    Personal history of other endocrine, nutritional and metabolic disease 01/28/2016    History of hypoglycemia    PVD (peripheral vascular disease) (CMS-HCC) 06/20/2023    Sleep apnea     Supraventricular tachycardia (CMS-HCC) 06/20/2023    Vasovagal syncope         Past Surgical History:  Past Surgical History:   Procedure Laterality Date    CARDIAC CATHETERIZATION      x 2    OTHER SURGICAL HISTORY  07/29/2019    Neck surgery    PROSTATE SURGERY      VASECTOMY  01/28/2016    Surgery Vas Deferens Vasectomy       Social History:  Social History     Socioeconomic History    Marital status:     Number of children: 2   Occupational History    Occupation: Retired   Tobacco Use    Smoking status: Never    Smokeless tobacco: Never   Vaping Use    Vaping status: Never Used   Substance and Sexual Activity    Alcohol use: Yes     Comment: RARELY    Drug use: Never    Sexual activity: Defer       Family History:  Family History   Problem Relation Name Age of Onset    Breast cancer Mother      Accidental death Father      No Known Problems Sister      Hypertension Brother           Allergies:  Adhesive tape-silicones and Penicillins    Outpatient Medications:  Current Outpatient Medications   Medication Sig Dispense Refill    allopurinol (Zyloprim) 300 mg tablet Take 1 tablet (300 mg) by mouth once daily. 90 " tablet 3    aspirin 81 mg EC tablet Take 1 tablet (81 mg) by mouth once daily.      atorvastatin (Lipitor) 40 mg tablet TAKE ONE TABLET BY MOUTH DAILY 90 tablet 0    calcium polycarbophil (FIBERCON ORAL) Take 2 tablets by mouth once daily at bedtime.      cholecalciferol (Vitamin D-3) 25 MCG (1000 UT) capsule Take 2 capsules (50 mcg) by mouth once daily.      coenzyme Q-10 100 mg capsule Take by mouth.      cyclobenzaprine (Flexeril) 10 mg tablet Take 1 tablet (10 mg) by mouth 2 times a day as needed for muscle spasms. 30 tablet 0    fluoride, sodium, (Prevident 5000 Booster) 1.1 % dental paste BRUSH TWICE A DAY. NO EATING / RINSING / DRINKING FOR 30 MINUTES AFTER      lisinopril 20 mg tablet Take 1 tablet by mouth every day 90 tablet 1    meloxicam (Mobic) 15 mg tablet TAKE ONE TABLET BY MOUTH EVERY DAY AS NEEDED 90 tablet 0    multivit-min/folic acid/vit K1 (MULTI FOR HIM, NO IRON, ORAL) Take by mouth.      omeprazole (PriLOSEC) 20 mg DR capsule Take 1 capsule (20 mg) by mouth once daily.      sildenafil (Viagra) 50 mg tablet TAKE 1 TABLET BY MOUTH EVERY DAY AS NEEDED APPROXIMATELY 1 HOUR BEFORE SEXUAL ACTIVITY      triamcinolone (Kenalog) 0.1 % ointment apply twice daily to affected areas for no more than 2 weeks per month.      fluocinolone (DermOtic) 0.01 % ear drops Instill 5 drops in the affected ear once a day as needed for itching (Patient not taking: Reported on 12/12/2024) 20 mL 3     No current facility-administered medications for this visit.        Physical Exam:  Cardiovascular:      PMI at left midclavicular line. Normal rate. Regular rhythm. Normal S1. Normal S2.       Murmurs: There is no murmur.      No gallop.  No click. No rub.   Pulses:     Intact distal pulses.   Edema:     Peripheral edema absent.         ROS:  Review of Systems   Constitutional: Negative.   Cardiovascular: Negative.    Respiratory: Negative.     Skin: Negative.    Musculoskeletal: Negative.    Gastrointestinal: Negative.     Genitourinary: Negative.    Neurological: Negative.           Last Labs:  CBC -  Lab Results   Component Value Date    WBC 8.9 07/08/2024    HGB 14.5 07/08/2024    HCT 44.5 07/08/2024    MCV 98 07/08/2024     07/08/2024       CMP -  Lab Results   Component Value Date    CALCIUM 9.8 07/08/2024    PHOS 3.0 01/23/2018    PROT 6.9 05/20/2024    ALBUMIN 4.4 05/20/2024    AST 18 05/20/2024    ALT 29 05/20/2024    ALKPHOS 58 05/20/2024    BILITOT 0.8 05/20/2024       LIPID PANEL -   Lab Results   Component Value Date    CHOL 137 05/26/2023    TRIG 136 05/26/2023    HDL 39.1 (A) 05/26/2023    CHHDL 3.5 05/26/2023    LDLF 71 05/26/2023    VLDL 27 05/26/2023       RENAL FUNCTION PANEL -   Lab Results   Component Value Date    GLUCOSE 103 (H) 07/08/2024     07/08/2024    K 5.1 07/08/2024     07/08/2024    CO2 25 07/08/2024    ANIONGAP 11 07/08/2024    ANIONGAP 15 05/20/2024    ANIONGAP 15 05/20/2024    BUN 19 07/08/2024    CREATININE 1.10 07/08/2024    GFRMALE 68 05/26/2023    CALCIUM 9.8 07/08/2024    PHOS 3.0 01/23/2018    ALBUMIN 4.4 05/20/2024        Lab Results   Component Value Date    HGBA1C 5.6 05/26/2023         Assessment/Plan   Problem List Items Addressed This Visit    None  1. Chest pain. Patient presented to [facility] July 7, 2017 with complaints of chest pain. He states he has had two cardiac catheters in the past, the last one about 15 years prior which he was told were normal. At that time he underwent a stress echo which was negative for ischemia. He also had an echocardiogram which showed mild LVH, EF of 65-69%, and mild aortic valve sclerosis.  Patient remains without any anginal type chest discomfort.  He did have a pharmacological nuclear stress test on 6/1/2023 that was negative for any ischemic or other perfusion defects.  Patient will continue current therapy return in 6 months for follow-up.     2. Hypertension.  Blood pressure appears to be well within normal range.  Will  continue on lisinopril 20 mg daily.     3. Hyperlipidemia. Fasting lipid panel 12/2021 showed a cholesterol 131, HDL 42, LDL 70, triglyceride 95.  The patient's most recent labs from 5/26/2023 include cholesterol 137 LDL 71 HDL 39 triglyceride 130.  The CBC and comprehensive metabolic panels were normal.  Glycohemoglobin 5.6% TSH 1.95 PSA 2.33.  Patient will remain on atorvastatin 40 mg daily.  Recheck labs after next visit.  Continue atorvastatin 40 mg daily.     4. Obstructive sleep apnea.  Patient has had a longstanding history of confirmed obstructive sleep apnea.  He falls asleep at times inappropriately In Charge watching television.  He has good and bad nights for sleep.  He has had a CPAP mask for many years but it has not been evaluated or changed in a number of years.  He will be referred to the sleep medicine nurse practitioner for reevaluation.     5. Neck and spine surgery. After being ruled out for cardiac etiology with [facility] visit July 2017, patient saw neurology, Dr Manuel. He had neck surgery 02/2018 with Dr Jaramillo and apparently spine surgery may follow as he is still having low back pain.      6. Adenoma left adrenal. Patient had a CT of the chest and 7/2017 which showed a small, minute adenoma of the left adrenal.     7. Carotid US done 09/2017 showed less than 50% stenosis bilaterally.     8. PVRs done 09/2017 were negative.  Of note at the time of the patient's recent abdominal and pelvic CT urogram in 5/2024 he was noted to have severe intra-abdominal vascular calcifications.     9. Hx of vagal response. Wore Zio patch monitor 03/2022 that showed heart rate of 29-1 39 with an average of 84 bpm. Sinus rhythm. 2 SVT runs. Second-degree AV block Mobitz 1 was rarely present. Rare ectopy was noted. Patient triggered response for ectopy. Patient wears life alert necklace. Will notify office for any further presyncopal or syncopal events.     10.  Renal calculi.  This patient had an  episode of gross hematuria several weeks ago without any associated pain.  He was seen by his primary care physician referred to a urologist.  Within a period of 1 week there was no visible hematuria.  He had an abdominal and pelvic CT urogram performed that showed multiple bilateral nonobstructing renal calculi 12 mm posterior urinary bladder calculus as well +10 mm left adrenal adenoma which was stable.  He was also noted to have severe vascular atherosclerotic disease.           Zehra Jeffrey, APRN-CNP

## 2024-12-16 NOTE — PROGRESS NOTES
Did they provide information Why It was denied?   Scribed for Dr. Millicent Balbuena by Trina Aguilera I, Dr. Millicent Balbuena, have personally reviewed and agreed with the information entered by the Virtual Scribe. 06/11/24    History of Present Illness (HPI):  TODAY: (06/11/24)  Cl Feliz is a 78 y.o. male with history of BPH    Presents with cystoscopy with no concerns.  Imaging as reviewed by Millicent Balbuena MD    IMPRESSION:  1.  Multiple bilateral nonobstructing renal calculi, the largest on  the right measures 10 mm. No hydronephrosis.  2. 12 mm posterior urinary bladder wall calculus.  3. Bilateral simple renal cysts. No suspicious mass or abnormal  enhancement.  4. 10 mm left adrenal adenomas, stable since 2018.  5. Severe atherosclerotic calcifications.    Prostate from CT:  width is 50mm, height is 4.7mm, length is 4.5mm. Total prostate volume is 55cc's.        Past Medical History:   Diagnosis Date    Personal history of other endocrine, nutritional and metabolic disease 01/28/2016    History of hypoglycemia    Supraventricular tachycardia (CMS-HCC) 06/20/2023     Past Surgical History:   Procedure Laterality Date    OTHER SURGICAL HISTORY  07/29/2019    Neck surgery    VASECTOMY  01/28/2016    Surgery Vas Deferens Vasectomy     Family History   Problem Relation Name Age of Onset    Breast cancer Mother      Accidental death Father      No Known Problems Sister      Hypertension Brother       Social History     Tobacco Use   Smoking Status Never   Smokeless Tobacco Never     Current Outpatient Medications   Medication Sig Dispense Refill    albuterol 90 mcg/actuation inhaler Inhale 1 puff every 4 hours if needed.      allopurinol (Zyloprim) 300 mg tablet Take 1 tablet (300 mg) by mouth once daily. 90 tablet 3    aspirin 81 mg EC tablet Take 1 tablet (81 mg) by mouth once daily.      atorvastatin (Lipitor) 40 mg tablet Take 1 tablet (40 mg) by mouth once daily.      calcium polycarbophil (FIBERCON ORAL) Take 2 tablets by mouth once daily at bedtime.       cholecalciferol (Vitamin D-3) 25 MCG (1000 UT) capsule Take 2 capsules (50 mcg) by mouth once daily.      coenzyme Q-10 100 mg capsule Take by mouth.      cyclobenzaprine (Flexeril) 10 mg tablet Take 1 tablet (10 mg) by mouth 2 times a day as needed for muscle spasms. 30 tablet 0    fluocinolone (DermOtic) 0.01 % ear drops Instill 5 drops in the affected ear once a day as needed for itching 20 mL 3    fluoride, sodium, (Prevident 5000 Booster) 1.1 % dental paste BRUSH TWICE A DAY. NO EATING / RINSING / DRINKING FOR 30 MINUTES AFTER      lisinopril 20 mg tablet take one tablet by mouth every day 90 tablet 1    meloxicam (Mobic) 15 mg tablet TAKE ONE TABLET BY MOUTH EVERY DAY AS NEEDED 90 tablet 0    multivit-min/folic acid/vit K1 (MULTI FOR HIM, NO IRON, ORAL) Take by mouth.      omeprazole (PriLOSEC) 20 mg DR capsule Take 1 capsule (20 mg) by mouth once daily.      sildenafil (Viagra) 50 mg tablet TAKE 1 TABLET BY MOUTH EVERY DAY AS NEEDED APPROXIMATELY 1 HOUR BEFORE SEXUAL ACTIVITY      tamsulosin (Flomax) 0.4 mg 24 hr capsule Take 1 capsule (0.4 mg) by mouth once daily. 30 capsule 2    triamcinolone (Kenalog) 0.1 % ointment apply twice daily to affected areas for no more than 2 weeks per month.       No current facility-administered medications for this visit.     Allergies   Allergen Reactions    Penicillin G Unknown    Penicillins Unknown     Past medical, surgical, family and social history in the chart was reviewed and is accurate including any additions to what is in this HPI.    Review of systems (ROS):   Pertinent information as listed in the HPI.        Objective   There were no vitals taken for this visit.  Physical Exam:  Constitutional: NAD  HEENT: AT/NC  Resp: Non labored respirations.  Skin: No jaundice or visible skin lesions.  Neuro: No focal deficits.  Psych: Appropriate mood and affect.    Lab Review:  Lab Results   Component Value Date    WBC 9.2 05/20/2024    RBC 4.64 05/20/2024    HGB 15.1  05/20/2024    HCT 45.5 05/20/2024     05/20/2024      Lab Results   Component Value Date    BUN 25 (H) 05/20/2024    BUN 25 (H) 05/20/2024    CREATININE 1.12 05/20/2024    CREATININE 1.06 05/20/2024      Lab Results   Component Value Date    HGBA1C 5.6 05/26/2023     Lab Results   Component Value Date    CHOL 137 05/26/2023    TRIG 136 05/26/2023    HDL 39.1 (A) 05/26/2023    ALT 29 05/20/2024    AST 18 05/20/2024     05/20/2024     05/20/2024    K 4.2 05/20/2024    K 4.2 05/20/2024     05/20/2024     05/20/2024    CO2 24 05/20/2024    CO2 23 05/20/2024    TSH 1.95 05/26/2023    INR 1.0 05/20/2024        ASSESSMENT:  Problem List Items Addressed This Visit    None  Visit Diagnoses       Hematuria, unspecified type        Relevant Orders    POCT UA Automated manually resulted (Completed)           PROCEDURE NOTE:    PREOPERATIVE DIAGNOSIS:  Hematuria, bladder stone, BPH    POSTOPERATIVE DIAGNOSIS:  Same    OPERATION:  Flexible Cystourethroscopy      SURGEON:  Millicent Balbuena MD    ANESTHESIA:  2%  lidocaine jelly    COMPLICATIONS:  None    EBL: Minimal    DISPOSITION:  The patient was discharged home after the procedure, per routine.    INDICATIONS: :  Mr. Feliz is a 78 y.o. patient with a history of hematuria who presents today for Cystoscopy.     The indications, risks and benefits of this procedure were discussed with the patient, consent was obtained prior to the procedure, and to the best of my judgement the patient seemed to understand and agree to the procedure.    PROCEDURE:  The patient  was brought into the procedure suite and informed consent was reviewed and confirmed. Vital signs were obtained prior to the procedure: There were no vitals taken for this visit..  The patient was escorted onto the stretcher, placed supine, prepped with betadine and draped in the usual standard surgical fashion.  Intraurethral 2% viscous lidocaine jelly was used for local analgesia.  A 16 Ethiopian  flexible cystourethroscope was inserted into the urethra.       The penile urethra was normal.  Prostate: small intravesical median lobe, lateral hypertrophy, complete channel occlusion    Bladder: Upon entering the bladder the entire bladder was surveyed in a 360 degree fashion.  The left and right ureteral orifices were in normal orthotopic position effluxing clear yellow urine, bilaterally.   There was no evidence of any bladder lesions, fevidence of any mucosal changes. The cystoscope was then retroflexed.  The bladder neck was then further examined without any evidence of lesions.     Bladder was distended, trabeculated  2 cm bladder stone noted    The scope was then removed and in an antegrade fashion, the urethra and bladder were again resurveyed with no evidence of additional lesions.  The cystoscope was then fully removed.   The patient tolerated the procedure well.  Vitals were stable after the procedure.  The patient was able to void and was discharged home.  Verbal and written Post procedure instructions were reviewed with the patient.    IMPRESSION:  BPH, bladder stone    PLAN:  HoLEP/cystolithopaxy    PLAN:    Kidney stones will address in the future  Bladder stone/BPH  Prostate volume about 55 ml    Given the large volume of the prostate, I recommended proceeding with HoLEP with concurrent cystolithopaxy. I discussed that a laser will be used to shell out the obstructing tissue from the inside of the prostate gland. I discussed in detail the risks associated with the HoLEP procedure. As with any surgical procedure, HoLEP surgery has some risks. Such as, incontinence of urine which is common for a few months after surgery but is rarely permanent (about one percent of cases), bleeding after surgery, the need for transfusion or another operation due to bleeding, UTI, damage to the bladder, damage to the ureteral orifice (a small tube where kidney drains into the bladder), prolonged need for a catheter  after surgery, or scar tissue in the area of surgery or urethra. Retrograde ejaculation was discussed as a permanent irreversible side effect.    All questions were answered to the patient’s satisfaction.  Patient agrees with the plan and wishes to proceed.      I spent  40 minutes of dedicated E&M time, including preparation and review of records, notes, and data, time spent with patient/family, and documentation.     Scribed for Dr. Millicent Balbuena by Trina Aguilera I, Dr. Millicent Balbuena, have personally reviewed and agreed with the information entered by the Virtual Scribe. 06/11/24

## 2024-12-19 ENCOUNTER — APPOINTMENT (OUTPATIENT)
Dept: PRIMARY CARE | Facility: CLINIC | Age: 79
End: 2024-12-19
Payer: MEDICARE

## 2024-12-19 VITALS
SYSTOLIC BLOOD PRESSURE: 118 MMHG | OXYGEN SATURATION: 94 % | WEIGHT: 227 LBS | HEART RATE: 77 BPM | DIASTOLIC BLOOD PRESSURE: 50 MMHG | BODY MASS INDEX: 31.78 KG/M2 | HEIGHT: 71 IN

## 2024-12-19 DIAGNOSIS — E78.2 MIXED HYPERLIPIDEMIA: ICD-10-CM

## 2024-12-19 DIAGNOSIS — E66.811 CLASS 1 OBESITY WITH BODY MASS INDEX (BMI) OF 31.0 TO 31.9 IN ADULT, UNSPECIFIED OBESITY TYPE, UNSPECIFIED WHETHER SERIOUS COMORBIDITY PRESENT: ICD-10-CM

## 2024-12-19 DIAGNOSIS — I10 PRIMARY HYPERTENSION: Primary | ICD-10-CM

## 2024-12-19 DIAGNOSIS — Z86.39 H/O: OBESITY: ICD-10-CM

## 2024-12-19 DIAGNOSIS — E78.5 HYPERLIPIDEMIA, UNSPECIFIED HYPERLIPIDEMIA TYPE: ICD-10-CM

## 2024-12-19 PROCEDURE — 3074F SYST BP LT 130 MM HG: CPT | Performed by: STUDENT IN AN ORGANIZED HEALTH CARE EDUCATION/TRAINING PROGRAM

## 2024-12-19 PROCEDURE — 99214 OFFICE O/P EST MOD 30 MIN: CPT | Performed by: STUDENT IN AN ORGANIZED HEALTH CARE EDUCATION/TRAINING PROGRAM

## 2024-12-19 PROCEDURE — 3078F DIAST BP <80 MM HG: CPT | Performed by: STUDENT IN AN ORGANIZED HEALTH CARE EDUCATION/TRAINING PROGRAM

## 2024-12-19 PROCEDURE — 1036F TOBACCO NON-USER: CPT | Performed by: STUDENT IN AN ORGANIZED HEALTH CARE EDUCATION/TRAINING PROGRAM

## 2024-12-19 PROCEDURE — 1159F MED LIST DOCD IN RCRD: CPT | Performed by: STUDENT IN AN ORGANIZED HEALTH CARE EDUCATION/TRAINING PROGRAM

## 2024-12-19 PROCEDURE — 1123F ACP DISCUSS/DSCN MKR DOCD: CPT | Performed by: STUDENT IN AN ORGANIZED HEALTH CARE EDUCATION/TRAINING PROGRAM

## 2024-12-19 PROCEDURE — 1160F RVW MEDS BY RX/DR IN RCRD: CPT | Performed by: STUDENT IN AN ORGANIZED HEALTH CARE EDUCATION/TRAINING PROGRAM

## 2024-12-19 PROCEDURE — G2211 COMPLEX E/M VISIT ADD ON: HCPCS | Performed by: STUDENT IN AN ORGANIZED HEALTH CARE EDUCATION/TRAINING PROGRAM

## 2024-12-19 RX ORDER — ATORVASTATIN CALCIUM 40 MG/1
40 TABLET, FILM COATED ORAL DAILY
Qty: 90 TABLET | Refills: 0 | Status: SHIPPED | OUTPATIENT
Start: 2024-12-19

## 2024-12-19 NOTE — PROGRESS NOTES
"Subjective   Patient ID: Cl Feliz is a 79 y.o. male who presents for Follow-up (Pt is here for a 6 mo follow up.).  HPI   Eczema of ear   Use of flucinolone per ENT     Pt lower back has been worsening symptoms  Gait widened       Current Outpatient Medications:     allopurinol (Zyloprim) 300 mg tablet, Take 1 tablet (300 mg) by mouth once daily., Disp: 90 tablet, Rfl: 3    aspirin 81 mg EC tablet, Take 1 tablet (81 mg) by mouth once daily., Disp: , Rfl:     atorvastatin (Lipitor) 40 mg tablet, TAKE ONE TABLET BY MOUTH DAILY, Disp: 90 tablet, Rfl: 0    calcium polycarbophil (FIBERCON ORAL), Take 2 tablets by mouth once daily at bedtime., Disp: , Rfl:     cholecalciferol (Vitamin D-3) 25 MCG (1000 UT) capsule, Take 2 capsules (50 mcg) by mouth once daily., Disp: , Rfl:     coenzyme Q-10 100 mg capsule, Take by mouth., Disp: , Rfl:     fluoride, sodium, (Prevident 5000 Booster) 1.1 % dental paste, BRUSH TWICE A DAY. NO EATING / RINSING / DRINKING FOR 30 MINUTES AFTER, Disp: , Rfl:     lisinopril 20 mg tablet, Take 1 tablet by mouth every day, Disp: 90 tablet, Rfl: 1    meloxicam (Mobic) 15 mg tablet, TAKE ONE TABLET BY MOUTH EVERY DAY AS NEEDED, Disp: 90 tablet, Rfl: 0    multivit-min/folic acid/vit K1 (MULTI FOR HIM, NO IRON, ORAL), Take by mouth., Disp: , Rfl:     omeprazole (PriLOSEC) 20 mg DR capsule, Take 1 capsule (20 mg) by mouth once daily., Disp: , Rfl:     sildenafil (Viagra) 50 mg tablet, TAKE 1 TABLET BY MOUTH EVERY DAY AS NEEDED APPROXIMATELY 1 HOUR BEFORE SEXUAL ACTIVITY, Disp: , Rfl:     triamcinolone (Kenalog) 0.1 % ointment, apply twice daily to affected areas for no more than 2 weeks per month., Disp: , Rfl:     cyclobenzaprine (Flexeril) 10 mg tablet, Take 1 tablet (10 mg) by mouth 2 times a day as needed for muscle spasms., Disp: 30 tablet, Rfl: 0      Visit Vitals  /50   Pulse 77   Ht 1.803 m (5' 11\")   Wt 103 kg (227 lb)   SpO2 94%   BMI 31.66 kg/m²   Smoking Status Never   BSA 2.27 m² "         Objective   Physical Exam    Assessment/Plan   Diagnoses and all orders for this visit:  Primary hypertension  -     Comprehensive Metabolic Panel; Future  -     Lipid Panel; Future  -     TSH with reflex to Free T4 if abnormal; Future  -     CBC and Auto Differential; Future  -     Vitamin D 25-Hydroxy,Total (for eval of Vitamin D levels); Future  -     Hemoglobin A1C; Future  Mixed hyperlipidemia  -     Comprehensive Metabolic Panel; Future  -     Lipid Panel; Future  -     TSH with reflex to Free T4 if abnormal; Future  -     CBC and Auto Differential; Future  -     Vitamin D 25-Hydroxy,Total (for eval of Vitamin D levels); Future  -     Hemoglobin A1C; Future  Class 1 obesity with body mass index (BMI) of 31.0 to 31.9 in adult, unspecified obesity type, unspecified whether serious comorbidity present  -     Comprehensive Metabolic Panel; Future  -     Lipid Panel; Future  -     TSH with reflex to Free T4 if abnormal; Future  -     CBC and Auto Differential; Future  -     Vitamin D 25-Hydroxy,Total (for eval of Vitamin D levels); Future  -     Hemoglobin A1C; Future  H/O: obesity  -     Hemoglobin A1C; Future  Hyperlipidemia, unspecified hyperlipidemia type  -     atorvastatin (Lipitor) 40 mg tablet; Take 1 tablet (40 mg) by mouth once daily.      HTN  BP was good at 118/50mmHg  Refilled lisinopril 20mg daily.  Physical exam was stable. Discussed maintaining diets such as DASH to help maintain normal Blood pressure. Encouraged regular exercise for a total of 120min weekly. We will fu labs in 1-3 days. For now there will be no change in medical management. All questions and concerns were addressed. Pt will follow up in 4-6months or sooner if needed.  - seeing Cardiology- Dr. Betancourt/Zehra Jeffrey         S/p cystoscopy   12 mm posterior urinary bladder wall calculus.  3. Bilateral simple renal cysts. No suspicious mass or abnormal  enhancement.  4. 10 mm left adrenal adenomas, stable since 2018.  5. Severe  atherosclerotic calcifications.  Planning for for 7/22 surgery   Preopt testing 7/8 preopt testing      DSL   continue atorvastatin 40mg daily   followed by cardio     Gout  - on allopurinol 300 mg daily  - no gouty attacks recently     S/P cervical surgery c4-c7 2018/ lumbar radiculopathy  - seeing Ortho- Dr. Jaramillo  - meloxicam 15mg   - continue with physical therapy      PVD  stable     WES   Following with sleep medicine  Bipap tolerance and compliance doing well     BPH  Patient is status post PVP HolEP on 7/15/2024 with Dr. Balbuena  for his BPH with obstruction  Patient had a Shaw blockage due to a clot which was flushed at the emergency department on 7/22/2024  Patient's Shaw was removed earlier today with urology.  He has had no complications with removal.  Patient has successfully voided since his visit earlier today.  Patient has a follow-up scheduled with urology in October 2024    10/9/2024 which showed 27g of residual tissue with a PI-RADS 4 lesion is present in the anterior prostate gland ending from base to apex and abutting the anterior fibromuscular stroma. No evidence of gross extracapsular extension or pelvic lymphadenopathy.     We discussed monitoring with PSA and prostate MRI vs. Biopsy. Patient prefers to proceed with monitoring with PSA checks and prostate MRI due to his advanced age.    Lumbar radiculopathy   MR 2017- egenerative changes of the lumbar spine including marked  spinal canal stenosis at L3-L4 and moderate to marked spinal canal  stenosis at L1-L2 and L2-L3.    S/p Laminectomy   Prior left-sided laminectomy at C5 and C6    Health Maintenance   Topic Date Due    Zoster Vaccines (1 of 2) Never done    RSV High Risk: (Elderly (60+) or Pregnant Population) (1 - 1-dose 75+ series) Never done    Pneumococcal Vaccine (2 of 2 - PPSV23) 11/12/2021    Influenza Vaccine (1) 09/01/2024    COVID-19 Vaccine (3 - 2024-25 season) 09/01/2024    Medicare Annual Wellness Visit (AWV)  06/20/2025     DTaP/Tdap/Td Vaccines (2 - Td or Tdap) 10/13/2027    Lipid Panel  05/26/2028    HIB Vaccines  Aged Out    Hepatitis B Vaccines  Aged Out    IPV Vaccines  Aged Out    Hepatitis A Vaccines  Aged Out    Meningococcal Vaccine  Aged Out    Rotavirus Vaccines  Aged Out    HPV Vaccines  Aged Out            An Gutiérrez DO 12/19/24 10:22 AM

## 2025-01-14 ENCOUNTER — LAB (OUTPATIENT)
Dept: LAB | Facility: LAB | Age: 80
End: 2025-01-14
Payer: MEDICARE

## 2025-01-14 DIAGNOSIS — C61 MALIGNANT NEOPLASM OF PROSTATE (MULTI): ICD-10-CM

## 2025-01-14 PROCEDURE — 84153 ASSAY OF PSA TOTAL: CPT

## 2025-01-15 LAB — PSA SERPL-MCNC: 0.56 NG/ML

## 2025-01-21 ENCOUNTER — APPOINTMENT (OUTPATIENT)
Dept: UROLOGY | Facility: CLINIC | Age: 80
End: 2025-01-21
Payer: MEDICARE

## 2025-01-21 DIAGNOSIS — N13.8 BPH WITH OBSTRUCTION/LOWER URINARY TRACT SYMPTOMS: ICD-10-CM

## 2025-01-21 DIAGNOSIS — N40.1 BPH WITH OBSTRUCTION/LOWER URINARY TRACT SYMPTOMS: ICD-10-CM

## 2025-01-21 DIAGNOSIS — C61 MALIGNANT NEOPLASM OF PROSTATE (MULTI): Primary | ICD-10-CM

## 2025-01-21 PROCEDURE — G2211 COMPLEX E/M VISIT ADD ON: HCPCS | Performed by: UROLOGY

## 2025-01-21 PROCEDURE — 99213 OFFICE O/P EST LOW 20 MIN: CPT | Performed by: UROLOGY

## 2025-01-21 PROCEDURE — 1123F ACP DISCUSS/DSCN MKR DOCD: CPT | Performed by: UROLOGY

## 2025-01-21 NOTE — PROGRESS NOTES
Subjective     This visit was completed via telemedicine. All issues as below were discussed and addressed but no physical exam was performed unless allowed by visual confirmation. If it was felt that the patient should be evaluated in clinic, then they were directed there. Patient verbally consented to visit.    Cl Feliz is a 79 y.o. male with history of BPH and bladder stones s/p HoLEP with concurrent cystolitholapaxy on 07/22/24, [place] 6 prostate cancer, nephrolithiasis, and renal cysts. Patient presents today for a follow up visit. Patient reports he occasionally has to double void which is not bothersome. Denies any recent gross hematuria, fevers, chills, urinary retention, intractable flank or abdominal pain, nausea or vomiting.              Past Medical History:   Diagnosis Date    Atherosclerosis     Bladder stones 06/11/2024    BPH (benign prostatic hyperplasia)     Cervical spinal stenosis 10/10/2023    GERD (gastroesophageal reflux disease) 06/20/2023    Gout 06/20/2023    HLD (hyperlipidemia)     HTN (hypertension)     Lumbar stenosis 06/20/2023    Personal history of other endocrine, nutritional and metabolic disease 01/28/2016    History of hypoglycemia    PVD (peripheral vascular disease) (CMS-[facility]) 06/20/2023    Sleep apnea     Supraventricular tachycardia (CMS-HCC) 06/20/2023    Vasovagal syncope      Past Surgical History:   Procedure Laterality Date    CARDIAC CATHETERIZATION      x 2    OTHER SURGICAL HISTORY  07/29/2019    Neck surgery    PROSTATE SURGERY      VASECTOMY  01/28/2016    Surgery Vas Deferens Vasectomy     Family History   Problem Relation Name Age of Onset    Breast cancer Mother      Accidental death Father      No Known Problems Sister      Hypertension Brother       Current Outpatient Medications   Medication Sig Dispense Refill    allopurinol (Zyloprim) 300 mg tablet Take 1 tablet (300 mg) by mouth once daily. 90 tablet 3    aspirin 81 mg EC tablet Take 1 tablet (81 mg) by  mouth once daily.      atorvastatin (Lipitor) 40 mg tablet Take 1 tablet (40 mg) by mouth once daily. 90 tablet 0    calcium polycarbophil (FIBERCON ORAL) Take 2 tablets by mouth once daily at bedtime.      cholecalciferol (Vitamin D-3) 25 MCG (1000 UT) capsule Take 2 capsules (50 mcg) by mouth once daily.      coenzyme Q-10 100 mg capsule Take by mouth.      cyclobenzaprine (Flexeril) 10 mg tablet Take 1 tablet (10 mg) by mouth 2 times a day as needed for muscle spasms. 30 tablet 0    fluoride, sodium, (Prevident 5000 Booster) 1.1 % dental paste BRUSH TWICE A DAY. NO EATING / RINSING / DRINKING FOR 30 MINUTES AFTER      lisinopril 20 mg tablet Take 1 tablet by mouth every day 90 tablet 1    meloxicam (Mobic) 15 mg tablet TAKE ONE TABLET BY MOUTH EVERY DAY AS NEEDED 90 tablet 0    multivit-min/folic acid/vit K1 (MULTI FOR HIM, NO IRON, ORAL) Take by mouth.      omeprazole (PriLOSEC) 20 mg DR capsule Take 1 capsule (20 mg) by mouth once daily.      sildenafil (Viagra) 50 mg tablet TAKE 1 TABLET BY MOUTH EVERY DAY AS NEEDED APPROXIMATELY 1 HOUR BEFORE SEXUAL ACTIVITY      triamcinolone (Kenalog) 0.1 % ointment apply twice daily to affected areas for no more than 2 weeks per month.       No current facility-administered medications for this visit.     Allergies   Allergen Reactions    Adhesive Tape-Silicones Rash    Penicillins Other     Childhood allergy     Social History     Socioeconomic History    Marital status:      Spouse name: Not on file    Number of children: 2    Years of education: Not on file    Highest education level: Not on file   Occupational History    Occupation: Retired   Tobacco Use    Smoking status: Never    Smokeless tobacco: Never   Vaping Use    Vaping status: Never Used   Substance and Sexual Activity    Alcohol use: Yes     Comment: RARELY    Drug use: Never    Sexual activity: Defer   Other Topics Concern    Not on file   Social History Narrative    Not on file     Social Drivers of  Health     Financial Resource Strain: Not on file   Food Insecurity: Not on file   Transportation Needs: Not on file   Physical Activity: Not on file   Stress: Not on file   Social Connections: Not on file   Intimate Partner Violence: Not on file   Housing Stability: Not on file       Review of Systems  Pertinent items are noted in HPI.    Objective       Lab Review  Lab Results   Component Value Date    WBC 8.9 07/08/2024    RBC 4.55 07/08/2024    HGB 14.5 07/08/2024    HCT 44.5 07/08/2024     07/08/2024      Lab Results   Component Value Date    BUN 19 07/08/2024    CREATININE 1.10 07/08/2024       Assessment/Plan   Diagnoses and all orders for this visit:  Malignant neoplasm of prostate (Multi)  -     PSA; Future  BPH with obstruction/lower urinary tract symptoms      BPH and bladder stones s/p HoLEP with concurrent cystolitholapaxy on 07/22/24    Patient's urinary symptoms are not bothersome. We will continue to monitor.     [place] 6 prostate cancer  PI-RADS 4/5 lesion     PSA is 0.56 on 1/14/2025, stable.     We will repeat PSA in 6 months and prostate MRI once a year.     Follow up virtually to review.     All questions were answered to the patient's satisfaction. Patient agrees with the plan and wishes to proceed. Follow-up will be scheduled appropriately.     E&M visit today is associated with current or anticipated ongoing medical care services related to a patient's single, serious condition or a complex condition.      Scribed for Dr. Balbuena by Zulma Heredia. I , Dr Balbuena, have personally reviewed and agreed with the information entered by the Virtual Scribe.

## 2025-02-28 DIAGNOSIS — M1A.0510: ICD-10-CM

## 2025-03-03 RX ORDER — MELOXICAM 15 MG/1
15 TABLET ORAL DAILY PRN
Qty: 90 TABLET | Refills: 0 | Status: SHIPPED | OUTPATIENT
Start: 2025-03-03

## 2025-03-14 DIAGNOSIS — E78.5 HYPERLIPIDEMIA, UNSPECIFIED HYPERLIPIDEMIA TYPE: ICD-10-CM

## 2025-03-17 RX ORDER — ATORVASTATIN CALCIUM 40 MG/1
40 TABLET, FILM COATED ORAL DAILY
Qty: 90 TABLET | Refills: 0 | Status: SHIPPED | OUTPATIENT
Start: 2025-03-17

## 2025-03-31 DIAGNOSIS — M10.9 GOUT, UNSPECIFIED CAUSE, UNSPECIFIED CHRONICITY, UNSPECIFIED SITE: ICD-10-CM

## 2025-03-31 RX ORDER — ALLOPURINOL 300 MG/1
300 TABLET ORAL DAILY
Qty: 90 TABLET | Refills: 3 | Status: SHIPPED | OUTPATIENT
Start: 2025-03-31

## 2025-04-14 ENCOUNTER — APPOINTMENT (OUTPATIENT)
Dept: SLEEP MEDICINE | Facility: CLINIC | Age: 80
End: 2025-04-14
Payer: MEDICARE

## 2025-04-14 ENCOUNTER — OFFICE VISIT (OUTPATIENT)
Dept: PRIMARY CARE | Facility: CLINIC | Age: 80
End: 2025-04-14
Payer: MEDICARE

## 2025-04-14 VITALS
OXYGEN SATURATION: 91 % | SYSTOLIC BLOOD PRESSURE: 132 MMHG | HEIGHT: 72 IN | BODY MASS INDEX: 30.2 KG/M2 | HEART RATE: 99 BPM | DIASTOLIC BLOOD PRESSURE: 79 MMHG | WEIGHT: 223 LBS

## 2025-04-14 DIAGNOSIS — J06.9 UPPER RESPIRATORY TRACT INFECTION, UNSPECIFIED TYPE: Primary | ICD-10-CM

## 2025-04-14 DIAGNOSIS — R05.1 ACUTE COUGH: ICD-10-CM

## 2025-04-14 PROCEDURE — 1159F MED LIST DOCD IN RCRD: CPT

## 2025-04-14 PROCEDURE — 3078F DIAST BP <80 MM HG: CPT

## 2025-04-14 PROCEDURE — 99213 OFFICE O/P EST LOW 20 MIN: CPT

## 2025-04-14 PROCEDURE — 1036F TOBACCO NON-USER: CPT

## 2025-04-14 PROCEDURE — 3075F SYST BP GE 130 - 139MM HG: CPT

## 2025-04-14 PROCEDURE — 1123F ACP DISCUSS/DSCN MKR DOCD: CPT

## 2025-04-14 RX ORDER — AZITHROMYCIN 500 MG/1
500 TABLET, FILM COATED ORAL DAILY
Qty: 5 TABLET | Refills: 0 | Status: SHIPPED | OUTPATIENT
Start: 2025-04-14 | End: 2025-04-19

## 2025-04-14 RX ORDER — METHYLPREDNISOLONE 4 MG/1
TABLET ORAL
Qty: 21 TABLET | Refills: 0 | Status: SHIPPED | OUTPATIENT
Start: 2025-04-14 | End: 2025-04-20

## 2025-04-14 RX ORDER — ALBUTEROL SULFATE 90 UG/1
2 INHALANT RESPIRATORY (INHALATION) EVERY 4 HOURS PRN
Qty: 8.5 G | Refills: 5 | Status: SHIPPED | OUTPATIENT
Start: 2025-04-14 | End: 2026-04-14

## 2025-04-14 RX ORDER — BENZONATATE 200 MG/1
200 CAPSULE ORAL 3 TIMES DAILY PRN
Qty: 42 CAPSULE | Refills: 0 | Status: SHIPPED | OUTPATIENT
Start: 2025-04-14 | End: 2025-05-14

## 2025-04-14 ASSESSMENT — ENCOUNTER SYMPTOMS
SHORTNESS OF BREATH: 0
CHILLS: 0
RHINORRHEA: 1
CONSTIPATION: 0
ABDOMINAL PAIN: 0
NAUSEA: 0
DIARRHEA: 0
SORE THROAT: 0
CHEST TIGHTNESS: 0
SINUS PRESSURE: 1
DYSURIA: 0
FEVER: 0
SINUS PAIN: 1

## 2025-04-14 NOTE — PATIENT INSTRUCTIONS
- History and physical exam is reassuring with findings suggestive of acute sinusitis, do not suspect a pneumonia or bronchitis at this time  - Given your symptoms and duration of illness, we feel that you can benefit from antibiotic coverage at this time  - A prescription for an antibiotic was sent to your pharmacy, please take this medication as prescribed  - Recommend supportive care with increased fluid intake to thin secretions, advil cold and sinus, or tylenol cold and sinus as long as you do not have high blood pressure, if you do have high blood pressure use coricidin HBP, and steamy showers/saline nasal rinses to help clear the nasal passages  - Vicks Vapor rub for cough  - You may consider tea with honey or a cinnamon stick as these have natural antiviral and antibiotic properties  - Can take zinc 50 mg daily with food to help prevent fight cold and flu  - call if symptoms worsen or do not improve with these treatments.

## 2025-04-15 ENCOUNTER — APPOINTMENT (OUTPATIENT)
Dept: SLEEP MEDICINE | Facility: CLINIC | Age: 80
End: 2025-04-15
Payer: MEDICARE

## 2025-04-15 NOTE — PROGRESS NOTES
"Subjective   Patient ID: Cl Feliz is a 79 y.o. male who presents for Sick Visit (Cl is here today for congestion, they came back from vacation x 4 days ago and started having symptoms. States he has SOB, sinus pressure, fever and cough).    HPI   Patient in today feeling ill was just on vacation and came back with sinus pressure shortness of breath, fever and cough.  Took NyQuil for the symptoms.  While on vacation they were with the a lot of family and a few sick children.  He has noticed that his eyes are extra goopy also draining a crusty drainage.    Review of Systems   Constitutional:  Negative for chills and fever.   HENT:  Positive for congestion, rhinorrhea, sinus pressure and sinus pain. Negative for ear pain and sore throat.    Respiratory:  Negative for chest tightness and shortness of breath.    Cardiovascular:  Negative for chest pain.   Gastrointestinal:  Negative for abdominal pain, constipation, diarrhea and nausea.   Genitourinary:  Negative for dysuria.   Skin:  Negative for rash.   Neurological:  Negative for syncope.       Objective   /79   Pulse 99   Ht 1.816 m (5' 11.5\")   Wt 101 kg (223 lb)   SpO2 91%   BMI 30.67 kg/m²     Physical Exam  Vitals reviewed.   Constitutional:       Appearance: Normal appearance.   HENT:      Nose: Congestion and rhinorrhea present.   Eyes:      General:         Right eye: Discharge present.         Left eye: Discharge present.  Cardiovascular:      Rate and Rhythm: Normal rate and regular rhythm.      Pulses: Normal pulses.      Heart sounds: Normal heart sounds.   Pulmonary:      Effort: Pulmonary effort is normal.      Breath sounds: Normal breath sounds.   Neurological:      General: No focal deficit present.      Mental Status: He is alert and oriented to person, place, and time.   Psychiatric:         Mood and Affect: Mood normal.         Behavior: Behavior normal.         Assessment/Plan   Diagnoses and all orders for this visit:  Upper " respiratory tract infection, unspecified type  Comments:  azithromycin (Zithromax) 500 mg tablet   methylPREDNISolone (Medrol Dospak) 4 mg  Orders:  -     azithromycin (Zithromax) 500 mg tablet; Take 1 tablet (500 mg) by mouth once daily for 5 days.  -     methylPREDNISolone (Medrol Dospak) 4 mg tablets; Take as directed on package.  Acute cough  Comments:  albuterol (ProAir HFA) 90 mcg/actuation inhaler ordered  benzonatate (Tessalon) 200 mg capsule  Orders:  -     albuterol (ProAir HFA) 90 mcg/actuation inhaler; Inhale 2 puffs every 4 hours if needed for wheezing or shortness of breath.  -     benzonatate (Tessalon) 200 mg capsule; Take 1 capsule (200 mg) by mouth 3 times a day as needed for cough. Do not crush or chew.

## 2025-04-17 DIAGNOSIS — I10 HYPERTENSION, UNSPECIFIED TYPE: ICD-10-CM

## 2025-04-17 RX ORDER — LISINOPRIL 20 MG/1
20 TABLET ORAL DAILY
Qty: 90 TABLET | Refills: 0 | Status: SHIPPED | OUTPATIENT
Start: 2025-04-17

## 2025-05-02 ENCOUNTER — TELEPHONE (OUTPATIENT)
Dept: PRIMARY CARE | Facility: CLINIC | Age: 80
End: 2025-05-02
Payer: MEDICARE

## 2025-05-22 ENCOUNTER — APPOINTMENT (OUTPATIENT)
Dept: OTOLARYNGOLOGY | Facility: CLINIC | Age: 80
End: 2025-05-22
Payer: MEDICARE

## 2025-05-22 VITALS — WEIGHT: 223 LBS | BODY MASS INDEX: 30.2 KG/M2 | HEIGHT: 72 IN

## 2025-05-22 DIAGNOSIS — H61.23 BILATERAL IMPACTED CERUMEN: ICD-10-CM

## 2025-05-22 DIAGNOSIS — H60.543 ECZEMA OF EXTERNAL EAR, BILATERAL: Primary | ICD-10-CM

## 2025-05-22 PROCEDURE — 1159F MED LIST DOCD IN RCRD: CPT | Performed by: OTOLARYNGOLOGY

## 2025-05-22 PROCEDURE — 99212 OFFICE O/P EST SF 10 MIN: CPT | Performed by: OTOLARYNGOLOGY

## 2025-05-22 PROCEDURE — 1160F RVW MEDS BY RX/DR IN RCRD: CPT | Performed by: OTOLARYNGOLOGY

## 2025-05-22 PROCEDURE — 1036F TOBACCO NON-USER: CPT | Performed by: OTOLARYNGOLOGY

## 2025-05-22 PROCEDURE — 69210 REMOVE IMPACTED EAR WAX UNI: CPT | Performed by: OTOLARYNGOLOGY

## 2025-05-23 DIAGNOSIS — M1A.0510: ICD-10-CM

## 2025-05-27 RX ORDER — MELOXICAM 15 MG/1
15 TABLET ORAL DAILY PRN
Qty: 90 TABLET | Refills: 0 | Status: SHIPPED | OUTPATIENT
Start: 2025-05-27

## 2025-06-12 ENCOUNTER — APPOINTMENT (OUTPATIENT)
Dept: CARDIOLOGY | Facility: CLINIC | Age: 80
End: 2025-06-12
Payer: MEDICARE

## 2025-06-18 ENCOUNTER — OFFICE VISIT (OUTPATIENT)
Dept: CARDIOLOGY | Facility: CLINIC | Age: 80
End: 2025-06-18
Payer: MEDICARE

## 2025-06-18 VITALS
OXYGEN SATURATION: 96 % | HEART RATE: 82 BPM | SYSTOLIC BLOOD PRESSURE: 138 MMHG | BODY MASS INDEX: 30.48 KG/M2 | DIASTOLIC BLOOD PRESSURE: 76 MMHG | HEIGHT: 72 IN | WEIGHT: 225 LBS

## 2025-06-18 DIAGNOSIS — E78.2 MIXED HYPERLIPIDEMIA: ICD-10-CM

## 2025-06-18 DIAGNOSIS — I10 PRIMARY HYPERTENSION: Primary | ICD-10-CM

## 2025-06-18 DIAGNOSIS — I73.9 PVD (PERIPHERAL VASCULAR DISEASE): ICD-10-CM

## 2025-06-18 PROCEDURE — 99212 OFFICE O/P EST SF 10 MIN: CPT

## 2025-06-18 PROCEDURE — 99214 OFFICE O/P EST MOD 30 MIN: CPT | Performed by: INTERNAL MEDICINE

## 2025-06-18 PROCEDURE — 3074F SYST BP LT 130 MM HG: CPT | Performed by: INTERNAL MEDICINE

## 2025-06-18 PROCEDURE — 1160F RVW MEDS BY RX/DR IN RCRD: CPT | Performed by: INTERNAL MEDICINE

## 2025-06-18 PROCEDURE — 1159F MED LIST DOCD IN RCRD: CPT | Performed by: INTERNAL MEDICINE

## 2025-06-18 PROCEDURE — 3078F DIAST BP <80 MM HG: CPT | Performed by: INTERNAL MEDICINE

## 2025-06-18 PROCEDURE — G2211 COMPLEX E/M VISIT ADD ON: HCPCS | Performed by: INTERNAL MEDICINE

## 2025-06-18 RX ORDER — LOTILANER OPHTHALMIC SOLUTION 2.5 MG/ML
SOLUTION/ DROPS OPHTHALMIC
COMMUNITY
Start: 2025-04-30

## 2025-06-18 ASSESSMENT — ENCOUNTER SYMPTOMS
RESPIRATORY NEGATIVE: 1
CONSTITUTIONAL NEGATIVE: 1
MUSCULOSKELETAL NEGATIVE: 1
GASTROINTESTINAL NEGATIVE: 1
NEUROLOGICAL NEGATIVE: 1
DEPRESSION: 0
CARDIOVASCULAR NEGATIVE: 1

## 2025-06-18 NOTE — PROGRESS NOTES
"Chief Complaint:   Follow-up    History Of Present Illness:    .Mr Tray returns in follow up.  Denies chest pain, sob, palpitations or pedal edema.         Last Recorded Vitals:  Height 1.816 m (5' 11.5\").     Past Medical History:  Past Medical History:   Diagnosis Date    Atherosclerosis     Bladder stones 06/11/2024    BPH (benign prostatic hyperplasia)     Cervical spinal stenosis 10/10/2023    GERD (gastroesophageal reflux disease) 06/20/2023    Gout 06/20/2023    HLD (hyperlipidemia)     HTN (hypertension)     Lumbar stenosis 06/20/2023    Personal history of other endocrine, nutritional and metabolic disease 01/28/2016    History of hypoglycemia    PVD (peripheral vascular disease) 06/20/2023    Sleep apnea     Supraventricular tachycardia 06/20/2023    Vasovagal syncope         Past Surgical History:  Past Surgical History:   Procedure Laterality Date    CARDIAC CATHETERIZATION      x 2    OTHER SURGICAL HISTORY  07/29/2019    Neck surgery    PROSTATE SURGERY      VASECTOMY  01/28/2016    Surgery Vas Deferens Vasectomy       Social History:  Social History     Socioeconomic History    Marital status:     Number of children: 2   Occupational History    Occupation: Retired   Tobacco Use    Smoking status: Never    Smokeless tobacco: Never   Vaping Use    Vaping status: Never Used   Substance and Sexual Activity    Alcohol use: Yes     Comment: RARELY    Drug use: Never    Sexual activity: Defer       Family History:  Family History   Problem Relation Name Age of Onset    Breast cancer Mother      Accidental death Father      No Known Problems Sister      Hypertension Brother           Allergies:  Adhesive tape-silicones    Outpatient Medications:  Current Outpatient Medications   Medication Sig Dispense Refill    albuterol (ProAir HFA) 90 mcg/actuation inhaler Inhale 2 puffs every 4 hours if needed for wheezing or shortness of breath. 8.5 g 5    allopurinol (Zyloprim) 300 mg tablet TAKE ONE TABLET BY " MOUTH once EVERY DAY 90 tablet 3    aspirin 81 mg EC tablet Take 1 tablet (81 mg) by mouth once daily.      atorvastatin (Lipitor) 40 mg tablet take 1 tablet by mouth once daily 90 tablet 0    calcium polycarbophil (FIBERCON ORAL) Take 2 tablets by mouth once daily at bedtime.      cholecalciferol (Vitamin D-3) 25 MCG (1000 UT) capsule Take 2 capsules (2,000 Units) by mouth once daily.      coenzyme Q-10 100 mg capsule Take by mouth.      cyclobenzaprine (Flexeril) 10 mg tablet Take 1 tablet (10 mg) by mouth 2 times a day as needed for muscle spasms. 30 tablet 0    fluoride, sodium, (Prevident 5000 Booster) 1.1 % dental paste BRUSH TWICE A DAY. NO EATING / RINSING / DRINKING FOR 30 MINUTES AFTER      lisinopril 20 mg tablet TAKE 1 TABLET BY MOUTH EVERY DAY 90 tablet 0    meloxicam (Mobic) 15 mg tablet TAKE ONE TABLET BY MOUTH DAILY AS NEEDED 90 tablet 0    multivit-min/folic acid/vit K1 (MULTI FOR HIM, NO IRON, ORAL) Take by mouth.      omeprazole (PriLOSEC) 20 mg DR capsule Take 1 capsule (20 mg) by mouth once daily.      sildenafil (Viagra) 50 mg tablet TAKE 1 TABLET BY MOUTH EVERY DAY AS NEEDED APPROXIMATELY 1 HOUR BEFORE SEXUAL ACTIVITY      triamcinolone (Kenalog) 0.1 % ointment apply twice daily to affected areas for no more than 2 weeks per month. (Patient not taking: Reported on 4/14/2025)       No current facility-administered medications for this visit.        Physical Exam:  Cardiovascular:      PMI at left midclavicular line. Normal rate. Regular rhythm. Normal S1. Normal S2.       Murmurs: There is no murmur.      No gallop.  No click. No rub.   Pulses:     Intact distal pulses.   Edema:     Peripheral edema absent.       ROS:  Review of Systems   Constitutional: Negative.   Cardiovascular: Negative.    Respiratory: Negative.     Skin: Negative.    Musculoskeletal: Negative.    Gastrointestinal: Negative.    Genitourinary: Negative.    Neurological: Negative.           Last Labs:  CBC -  Lab Results    Component Value Date    WBC 8.9 07/08/2024    HGB 14.5 07/08/2024    HCT 44.5 07/08/2024    MCV 98 07/08/2024     07/08/2024       CMP -  Lab Results   Component Value Date    CALCIUM 9.8 07/08/2024    PHOS 3.0 01/23/2018    PROT 6.9 05/20/2024    ALBUMIN 4.4 05/20/2024    AST 18 05/20/2024    ALT 29 05/20/2024    ALKPHOS 58 05/20/2024    BILITOT 0.8 05/20/2024       LIPID PANEL -   Lab Results   Component Value Date    CHOL 137 05/26/2023    TRIG 136 05/26/2023    HDL 39.1 (A) 05/26/2023    CHHDL 3.5 05/26/2023    LDLF 71 05/26/2023    VLDL 27 05/26/2023       RENAL FUNCTION PANEL -   Lab Results   Component Value Date    GLUCOSE 103 (H) 07/08/2024     07/08/2024    K 5.1 07/08/2024     07/08/2024    CO2 25 07/08/2024    ANIONGAP 11 07/08/2024    ANIONGAP 15 05/20/2024    ANIONGAP 15 05/20/2024    BUN 19 07/08/2024    CREATININE 1.10 07/08/2024    GFRMALE 68 05/26/2023    CALCIUM 9.8 07/08/2024    PHOS 3.0 01/23/2018    ALBUMIN 4.4 05/20/2024        Lab Results   Component Value Date    HGBA1C 5.6 05/26/2023         Assessment/Plan   Problem List Items Addressed This Visit    None  1. Chest pain. Patient presented to [facility] July 7, 2017 with complaints of chest pain. He states he has had two cardiac catheters in the past, the last one about 15 years prior which he was told were normal. At that time he underwent a stress echo which was negative for ischemia. He also had an echocardiogram which showed mild LVH, EF of 65-69%, and mild aortic valve sclerosis.  Patient remains without any anginal type chest discomfort.  He did have a pharmacological nuclear stress test on 6/1/2023 that was negative for any ischemic or other perfusion defects.  Patient will continue current therapy return in 6 months for follow-up.     2. Hypertension.  Blood pressure appears to be well within normal range.  Will continue on lisinopril 20 mg daily.     3. Hyperlipidemia. Fasting lipid panel 12/2021 showed  a cholesterol 131, HDL 42, LDL 70, triglyceride 95.  The patient's most recent labs from 5/26/2023 include cholesterol 137 LDL 71 HDL 39 triglyceride 130.  The CBC and comprehensive metabolic panels were normal.  Glycohemoglobin 5.6% TSH 1.95 PSA 2.33.  Patient will remain on atorvastatin 40 mg daily.  Recheck labs after next visit.  Continue atorvastatin 40 mg daily.  Patient scheduled for lab work by primary care later in 2025 to include CBC CMP lipid panel TSH vitamin D level and PSA.     4. Obstructive sleep apnea.  Patient has had a longstanding history of confirmed obstructive sleep apnea.  He falls asleep at times inappropriately In [employer] watching television.  He has good and bad nights for sleep.  He has had a CPAP mask for many years but it has not been evaluated or changed in a number of years.  He will be referred to the sleep medicine nurse practitioner for reevaluation.     5. Neck and spine surgery. After being ruled out for cardiac etiology with [facility] visit July 2017, patient saw neurology, Dr Manuel. He had neck surgery 02/2018 with Dr Jaramillo and apparently spine surgery may follow as he is still having low back pain.      6. Adenoma left adrenal. Patient had a CT of the chest and 7/2017 which showed a small, minute adenoma of the left adrenal.     7. Carotid US done 09/2017 showed less than 50% stenosis bilaterally.     8. PVRs done 09/2017 were negative.  Of note at the time of the patient's recent abdominal and pelvic CT urogram in 5/2024 he was noted to have severe intra-abdominal vascular calcifications.     9. Hx of vagal response. Wore Zio patch monitor 03/2022 that showed heart rate of 29-1 39 with an average of 84 bpm. Sinus rhythm. 2 SVT runs. Second-degree AV block Mobitz 1 was rarely present. Rare ectopy was noted. Patient triggered response for ectopy. Patient wears life alert necklace. Will notify office for any further presyncopal or syncopal events.  The patient's  insurance company actually ordered a 2-week external cardiac monitor which demonstrated normal sinus rhythm first-degree AV block intermittent Mobitz 1 second-degree AV block and atrial ectopic activity consisting of less than 3% of entire record.  Patient remains without any lightheaded spells or fainting that would indicate the potential development of a higher grade degree AV truong heart block that would require permanent pacemaker insertion.     10.  Renal calculi.  This patient had an episode of gross hematuria several weeks ago without any associated pain.  He was seen by his primary care physician referred to a urologist.  Within a period of 1 week there was no visible hematuria.  He had an abdominal and pelvic CT urogram performed that showed multiple bilateral nonobstructing renal calculi 12 mm posterior urinary bladder calculus as well +10 mm left adrenal adenoma which was stable.  He was also noted to have severe vascular atherosclerotic disease.    11.  Prostate carcinoma.  On 7/22/2024 the patient underwent laser enucleation of the prostate along with removal of a bladder stone.  Prior to the procedure the patient had a CT urogram performed on 5/31/2024 showing multiple bilateral nonobstructing renal calculi the largest on the right measuring 10 mm.  There was also a 12 mm posterior urinary bladder wall calculus.  There was also bilateral simple renal cysts 10 mm left adrenal adenoma and severe atherosclerotic calcification.  Following the procedure on 10/9/2024 the patient had an MRI of the prostate showing evidence of status post HOL EP with a lesion present in the anterior prostate gland ending from base to apex abutting anterior fibromuscular stroma no evidence of gross extracapsular extension or pelvic lymphadenopathy.

## 2025-06-19 ENCOUNTER — APPOINTMENT (OUTPATIENT)
Dept: PRIMARY CARE | Facility: CLINIC | Age: 80
End: 2025-06-19
Payer: MEDICARE

## 2025-06-19 VITALS
SYSTOLIC BLOOD PRESSURE: 122 MMHG | DIASTOLIC BLOOD PRESSURE: 58 MMHG | BODY MASS INDEX: 31.67 KG/M2 | WEIGHT: 226.2 LBS | HEIGHT: 71 IN | HEART RATE: 68 BPM | OXYGEN SATURATION: 95 %

## 2025-06-19 DIAGNOSIS — K21.9 GASTROESOPHAGEAL REFLUX DISEASE WITHOUT ESOPHAGITIS: ICD-10-CM

## 2025-06-19 DIAGNOSIS — M54.31 SCIATICA OF RIGHT SIDE: ICD-10-CM

## 2025-06-19 DIAGNOSIS — G47.33 OSA (OBSTRUCTIVE SLEEP APNEA): ICD-10-CM

## 2025-06-19 DIAGNOSIS — E78.2 MIXED HYPERLIPIDEMIA: ICD-10-CM

## 2025-06-19 DIAGNOSIS — R26.9 GAIT DISTURBANCE: ICD-10-CM

## 2025-06-19 DIAGNOSIS — Z00.00 ROUTINE GENERAL MEDICAL EXAMINATION AT HEALTH CARE FACILITY: Primary | ICD-10-CM

## 2025-06-19 DIAGNOSIS — R20.2 NUMBNESS AND TINGLING: ICD-10-CM

## 2025-06-19 DIAGNOSIS — R20.0 NUMBNESS AND TINGLING: ICD-10-CM

## 2025-06-19 DIAGNOSIS — I10 PRIMARY HYPERTENSION: ICD-10-CM

## 2025-06-19 DIAGNOSIS — M54.16 LUMBAR RADICULOPATHY: ICD-10-CM

## 2025-06-19 DIAGNOSIS — G95.9 CERVICAL MYELOPATHY: ICD-10-CM

## 2025-06-19 PROBLEM — H60.399 ACUTE INFECTIVE OTITIS EXTERNA: Status: RESOLVED | Noted: 2020-05-26 | Resolved: 2025-06-19

## 2025-06-19 PROBLEM — R05.1 ACUTE COUGH: Status: RESOLVED | Noted: 2025-04-14 | Resolved: 2025-06-19

## 2025-06-19 PROBLEM — R55 NEAR SYNCOPE: Status: RESOLVED | Noted: 2023-10-10 | Resolved: 2025-06-19

## 2025-06-19 PROBLEM — J06.9 UPPER RESPIRATORY TRACT INFECTION: Status: RESOLVED | Noted: 2025-04-14 | Resolved: 2025-06-19

## 2025-06-19 PROCEDURE — 3078F DIAST BP <80 MM HG: CPT | Performed by: STUDENT IN AN ORGANIZED HEALTH CARE EDUCATION/TRAINING PROGRAM

## 2025-06-19 PROCEDURE — 1160F RVW MEDS BY RX/DR IN RCRD: CPT | Performed by: STUDENT IN AN ORGANIZED HEALTH CARE EDUCATION/TRAINING PROGRAM

## 2025-06-19 PROCEDURE — G0439 PPPS, SUBSEQ VISIT: HCPCS | Performed by: STUDENT IN AN ORGANIZED HEALTH CARE EDUCATION/TRAINING PROGRAM

## 2025-06-19 PROCEDURE — 99397 PER PM REEVAL EST PAT 65+ YR: CPT | Performed by: STUDENT IN AN ORGANIZED HEALTH CARE EDUCATION/TRAINING PROGRAM

## 2025-06-19 PROCEDURE — 3074F SYST BP LT 130 MM HG: CPT | Performed by: STUDENT IN AN ORGANIZED HEALTH CARE EDUCATION/TRAINING PROGRAM

## 2025-06-19 PROCEDURE — 1170F FXNL STATUS ASSESSED: CPT | Performed by: STUDENT IN AN ORGANIZED HEALTH CARE EDUCATION/TRAINING PROGRAM

## 2025-06-19 PROCEDURE — 99214 OFFICE O/P EST MOD 30 MIN: CPT | Performed by: STUDENT IN AN ORGANIZED HEALTH CARE EDUCATION/TRAINING PROGRAM

## 2025-06-19 PROCEDURE — 1159F MED LIST DOCD IN RCRD: CPT | Performed by: STUDENT IN AN ORGANIZED HEALTH CARE EDUCATION/TRAINING PROGRAM

## 2025-06-19 RX ORDER — CYCLOBENZAPRINE HCL 10 MG
10 TABLET ORAL NIGHTLY PRN
Qty: 30 TABLET | Refills: 0 | Status: SHIPPED | OUTPATIENT
Start: 2025-06-19 | End: 2025-08-18

## 2025-06-19 RX ORDER — CALCIUM CARBONATE 160(400)MG
1 TABLET,CHEWABLE ORAL ONCE
Qty: 1 EACH | Refills: 0 | Status: SHIPPED | OUTPATIENT
Start: 2025-06-19 | End: 2025-06-19

## 2025-06-19 RX ORDER — METHYLPREDNISOLONE 4 MG/1
TABLET ORAL
Qty: 21 TABLET | Refills: 0 | Status: SHIPPED | OUTPATIENT
Start: 2025-06-19 | End: 2025-06-25

## 2025-06-19 ASSESSMENT — ACTIVITIES OF DAILY LIVING (ADL)
DRESSING: INDEPENDENT
DOING_HOUSEWORK: INDEPENDENT
BATHING: INDEPENDENT
GROCERY_SHOPPING: INDEPENDENT
MANAGING_FINANCES: INDEPENDENT
TAKING_MEDICATION: INDEPENDENT

## 2025-06-19 ASSESSMENT — ENCOUNTER SYMPTOMS
OCCASIONAL FEELINGS OF UNSTEADINESS: 1
LOSS OF SENSATION IN FEET: 1
DEPRESSION: 0

## 2025-06-19 NOTE — PROGRESS NOTES
"Subjective   Reason for Visit: Cl Feliz is an 79 y.o. male here for a Medicare Wellness visit.     Past Medical, Surgical, and Family History reviewed and updated in chart.         HPI  Having sciatic pain on the right side   Worse with movement and standing  Numbness in the b/l feet     Patient Care Team:  An Gutiérrez,  as PCP - General (Family Medicine)  An Gutiérrez, DO as PCP - [place] Medicare Advantage PCP  An Gutiérrez, DO as PCP - [place] Medicare Advantage PCP       Objective   Vitals:  /58   Pulse 68   Ht 1.803 m (5' 11\")   Wt 103 kg (226 lb 3.2 oz)   SpO2 95%   BMI 31.55 kg/m²       Physical Exam  Vitals reviewed.   Constitutional:       Appearance: Normal appearance.   Cardiovascular:      Rate and Rhythm: Normal rate and regular rhythm.      Heart sounds: No murmur heard.  Pulmonary:      Effort: Pulmonary effort is normal. No respiratory distress.      Breath sounds: Normal breath sounds. No wheezing.   Musculoskeletal:      Cervical back: Neck supple.      Left lower leg: No edema.   Neurological:      Mental Status: He is alert.         Assessment & Plan  Routine general medical examination at health care facility         Lumbar radiculopathy    Orders:    Disability Placard    walker (Ultra-Light Rollator) misc; 1 each 1 time for 1 dose.    Cervical myelopathy    Orders:    Disability Placard    walker (Ultra-Light Rollator) misc; 1 each 1 time for 1 dose.    Gastroesophageal reflux disease without esophagitis         Primary hypertension    Orders:    Vitamin B12; Future    Comprehensive Metabolic Panel; Future    Lipid Panel; Future    TSH with reflex to Free T4 if abnormal; Future    CBC and Auto Differential; Future    Mixed hyperlipidemia         WES (obstructive sleep apnea)         Numbness and tingling    Orders:    Vitamin B12; Future    methylPREDNISolone (Medrol Dospak) 4 mg tablets; Take as directed on package.    cyclobenzaprine (Flexeril) 10 mg tablet; Take 1 tablet " (10 mg) by mouth as needed at bedtime for muscle spasms.    Gait disturbance         Sciatica of right side    Orders:    methylPREDNISolone (Medrol Dospak) 4 mg tablets; Take as directed on package.    cyclobenzaprine (Flexeril) 10 mg tablet; Take 1 tablet (10 mg) by mouth as needed at bedtime for muscle spasms.    HTN  BP was good at 122/58mmHg  Refilled lisinopril 20mg daily.  Physical exam was stable. Discussed maintaining diets such as DASH to help maintain normal Blood pressure. Encouraged regular exercise for a total of 120min weekly. We will fu labs in 1-3 days. For now there will be no change in medical management. All questions and concerns were addressed. Pt will follow up in 4-6months or sooner if needed.  - seeing Cardiology- Dr. Betancourt/Zehra Jeffrey         S/p cystoscopy   12 mm posterior urinary bladder wall calculus.  3. Bilateral simple renal cysts. No suspicious mass or abnormal  enhancement.  4. 10 mm left adrenal adenomas, stable since 2018.  5. Severe atherosclerotic calcifications.  Planning for for 7/22 surgery   Preopt testing 7/8 preopt testing      DSL   continue atorvastatin 40mg daily   followed by cardio     Gout  - on allopurinol 300 mg daily  - no gouty attacks recently     S/P cervical surgery c4-c7 2018/ lumbar radiculopathy  - seeing Ortho- Dr. Jaramillo  - meloxicam 15mg   - continue with physical therapy      PVD  stable     WES   Following with sleep medicine  Bipap tolerance and compliance doing well      BPH  Patient is status post PVP HolEP on 7/15/2024 with Dr. Balbuena  for his BPH with obstruction  Patient had a Shaw blockage due to a clot which was flushed at the emergency department on 7/22/2024  Patient's Shaw was removed earlier today with urology.  He has had no complications with removal.  Patient has successfully voided since his visit earlier today.  Patient has a follow-up scheduled with urology in October 2024     10/9/2024 which showed 27g of residual tissue with a  PI-RADS 4 lesion is present in the anterior prostate gland ending from base to apex and abutting the anterior fibromuscular stroma. No evidence of gross extracapsular extension or pelvic lymphadenopathy.     We discussed monitoring with PSA and prostate MRI vs. Biopsy. Patient prefers to proceed with monitoring with PSA checks and prostate MRI due to his advanced age.     Lumbar radiculopathy   Current flare  Medrol dose pack and flexeril sent   Next steps physical therapy  MR 2017- egenerative changes of the lumbar spine including marked  spinal canal stenosis at L3-L4 and moderate to marked spinal canal  stenosis at L1-L2 and L2-L3.     S/p Laminectomy   Prior left-sided laminectomy at C5 and C6

## 2025-06-29 NOTE — ASSESSMENT & PLAN NOTE
Orders:    Vitamin B12; Future    Comprehensive Metabolic Panel; Future    Lipid Panel; Future    TSH with reflex to Free T4 if abnormal; Future    CBC and Auto Differential; Future

## 2025-06-29 NOTE — ASSESSMENT & PLAN NOTE
Orders:    Disability Placard    walker (Ultra-Light Rollator) misc; 1 each 1 time for 1 dose.

## 2025-07-08 DIAGNOSIS — I10 HYPERTENSION, UNSPECIFIED TYPE: ICD-10-CM

## 2025-07-08 DIAGNOSIS — E78.5 HYPERLIPIDEMIA, UNSPECIFIED HYPERLIPIDEMIA TYPE: ICD-10-CM

## 2025-07-09 RX ORDER — ATORVASTATIN CALCIUM 40 MG/1
40 TABLET, FILM COATED ORAL DAILY
Qty: 90 TABLET | Refills: 1 | Status: SHIPPED | OUTPATIENT
Start: 2025-07-09

## 2025-07-09 RX ORDER — LISINOPRIL 20 MG/1
20 TABLET ORAL DAILY
Qty: 90 TABLET | Refills: 1 | Status: SHIPPED | OUTPATIENT
Start: 2025-07-09

## 2025-07-16 ENCOUNTER — OFFICE VISIT (OUTPATIENT)
Dept: PRIMARY CARE | Facility: CLINIC | Age: 80
End: 2025-07-16
Payer: MEDICARE

## 2025-07-16 ENCOUNTER — HOSPITAL ENCOUNTER (OUTPATIENT)
Dept: RADIOLOGY | Facility: HOSPITAL | Age: 80
Discharge: HOME | End: 2025-07-16
Payer: MEDICARE

## 2025-07-16 VITALS — WEIGHT: 226 LBS | HEART RATE: 90 BPM | BODY MASS INDEX: 31.64 KG/M2 | HEIGHT: 71 IN

## 2025-07-16 DIAGNOSIS — L08.9 TOE INFECTION: Primary | ICD-10-CM

## 2025-07-16 DIAGNOSIS — L08.9 TOE INFECTION: ICD-10-CM

## 2025-07-16 LAB — PSA SERPL-MCNC: 0.85 NG/ML

## 2025-07-16 PROCEDURE — 1159F MED LIST DOCD IN RCRD: CPT | Performed by: STUDENT IN AN ORGANIZED HEALTH CARE EDUCATION/TRAINING PROGRAM

## 2025-07-16 PROCEDURE — 1036F TOBACCO NON-USER: CPT | Performed by: STUDENT IN AN ORGANIZED HEALTH CARE EDUCATION/TRAINING PROGRAM

## 2025-07-16 PROCEDURE — 1126F AMNT PAIN NOTED NONE PRSNT: CPT | Performed by: STUDENT IN AN ORGANIZED HEALTH CARE EDUCATION/TRAINING PROGRAM

## 2025-07-16 PROCEDURE — 99213 OFFICE O/P EST LOW 20 MIN: CPT | Performed by: STUDENT IN AN ORGANIZED HEALTH CARE EDUCATION/TRAINING PROGRAM

## 2025-07-16 PROCEDURE — 1158F ADVNC CARE PLAN TLK DOCD: CPT | Performed by: STUDENT IN AN ORGANIZED HEALTH CARE EDUCATION/TRAINING PROGRAM

## 2025-07-16 PROCEDURE — 73630 X-RAY EXAM OF FOOT: CPT | Mod: LEFT SIDE | Performed by: RADIOLOGY

## 2025-07-16 PROCEDURE — 73630 X-RAY EXAM OF FOOT: CPT | Mod: LT

## 2025-07-16 RX ORDER — CEPHALEXIN 500 MG/1
500 CAPSULE ORAL 2 TIMES DAILY
Qty: 20 CAPSULE | Refills: 0 | Status: SHIPPED | OUTPATIENT
Start: 2025-07-16 | End: 2025-07-26

## 2025-07-16 ASSESSMENT — PAIN SCALES - GENERAL: PAINLEVEL_OUTOF10: 0-NO PAIN

## 2025-07-16 NOTE — PROGRESS NOTES
"  Subjective   Patient ID:   Cl Feliz is a  79 y.o. male who presents for Toe Problem (Patient is present in office for left big toe pain x years- caught on corner of something 4- 5 days ago and nail is hanging off - painful 154/82).     HPI   Pt states he hit his toe nail years ago but felt his toe nail pull out about 5 days ago. There was extreme pain at the time. He had some severe bleeding at that time. Since then the pain has decreased but he there has been an increase in erythema. No fever, chills, dizziness         Current Medications[1]    Visit Vitals  Pulse 90   Ht 1.803 m (5' 11\")   Wt 103 kg (226 lb)   BMI 31.52 kg/m²   Smoking Status Never   BSA 2.27 m²       Objective   Physical Exam  Vitals reviewed.   Constitutional:       Appearance: Normal appearance.     Cardiovascular:      Rate and Rhythm: Normal rate and regular rhythm.      Heart sounds: No murmur heard.  Pulmonary:      Effort: Pulmonary effort is normal. No respiratory distress.      Breath sounds: Normal breath sounds. No wheezing.     Musculoskeletal:      Cervical back: Neck supple.      Left lower leg: No edema.        Feet:    Feet:      Comments: Erythema to the great toe of the left foot, no pain with palpation, severely thickened and brittle nail, no bleeding    Neurological:      Mental Status: He is alert.           Assessment/Plan   Diagnoses and all orders for this visit:  Toe infection  -     cephalexin (Keflex) 500 mg capsule; Take 1 capsule (500 mg) by mouth 2 times a day for 10 days.       Toe infection   Nail completely removed today in office without resistance, the toe nail with bed matrix pulled out without tugging. No bleeding or   Very foul odor coming from the toe, concerns for deeper infection  Keflex ordered   Xray ordered   Pt instructed to keep keep and dry     An Gutiérrez DO 03/12/25 2:02 PM        [1]   Current Outpatient Medications:     allopurinol (Zyloprim) 300 mg tablet, TAKE ONE TABLET BY MOUTH once " EVERY DAY, Disp: 90 tablet, Rfl: 3    aspirin 81 mg EC tablet, Take 1 tablet (81 mg) by mouth once daily., Disp: , Rfl:     atorvastatin (Lipitor) 40 mg tablet, take 1 tablet by mouth once daily, Disp: 90 tablet, Rfl: 1    calcium polycarbophil (FIBERCON ORAL), Take 2 tablets by mouth once daily at bedtime., Disp: , Rfl:     cholecalciferol (Vitamin D-3) 25 MCG (1000 UT) capsule, Take 2 capsules (2,000 Units) by mouth once daily., Disp: , Rfl:     coenzyme Q-10 100 mg capsule, Take by mouth., Disp: , Rfl:     cyclobenzaprine (Flexeril) 10 mg tablet, Take 1 tablet (10 mg) by mouth as needed at bedtime for muscle spasms., Disp: 30 tablet, Rfl: 0    fluoride, sodium, (Prevident 5000 Booster) 1.1 % dental paste, BRUSH TWICE A DAY. NO EATING / RINSING / DRINKING FOR 30 MINUTES AFTER, Disp: , Rfl:     lisinopril 20 mg tablet, TAKE ONE TABLET BY MOUTH EVERY DAY, Disp: 90 tablet, Rfl: 1    meloxicam (Mobic) 15 mg tablet, TAKE ONE TABLET BY MOUTH DAILY AS NEEDED, Disp: 90 tablet, Rfl: 0    multivit-min/folic acid/vit K1 (MULTI FOR HIM, NO IRON, ORAL), Take by mouth., Disp: , Rfl:     sildenafil (Viagra) 50 mg tablet, TAKE 1 TABLET BY MOUTH EVERY DAY AS NEEDED APPROXIMATELY 1 HOUR BEFORE SEXUAL ACTIVITY, Disp: , Rfl:     triamcinolone (Kenalog) 0.1 % ointment, , Disp: , Rfl:     Xdemvy 0.25 % drops, INSTILL 1 DROP IN BOTH EYES TWICE DAILY FOR 6 WEEKS, Disp: , Rfl:

## 2025-07-22 ENCOUNTER — APPOINTMENT (OUTPATIENT)
Dept: UROLOGY | Facility: CLINIC | Age: 80
End: 2025-07-22
Payer: MEDICARE

## 2025-07-22 ENCOUNTER — HOSPITAL ENCOUNTER (EMERGENCY)
Facility: HOSPITAL | Age: 80
Discharge: HOME | End: 2025-07-22
Attending: EMERGENCY MEDICINE
Payer: MEDICARE

## 2025-07-22 ENCOUNTER — APPOINTMENT (OUTPATIENT)
Dept: CARDIOLOGY | Facility: HOSPITAL | Age: 80
End: 2025-07-22
Payer: MEDICARE

## 2025-07-22 VITALS
BODY MASS INDEX: 29.63 KG/M2 | DIASTOLIC BLOOD PRESSURE: 85 MMHG | TEMPERATURE: 97.9 F | WEIGHT: 211.64 LBS | RESPIRATION RATE: 16 BRPM | HEIGHT: 71 IN | HEART RATE: 73 BPM | SYSTOLIC BLOOD PRESSURE: 168 MMHG | OXYGEN SATURATION: 95 %

## 2025-07-22 DIAGNOSIS — C61 MALIGNANT NEOPLASM OF PROSTATE (MULTI): Primary | ICD-10-CM

## 2025-07-22 DIAGNOSIS — M79.622 LEFT UPPER ARM PAIN: Primary | ICD-10-CM

## 2025-07-22 LAB
ANION GAP SERPL CALCULATED.3IONS-SCNC: 14 MMOL/L (ref 10–20)
ATRIAL RATE: 74 BPM
BASOPHILS # BLD AUTO: 0.04 X10*3/UL (ref 0–0.1)
BASOPHILS NFR BLD AUTO: 0.4 %
BUN SERPL-MCNC: 20 MG/DL (ref 6–23)
CALCIUM SERPL-MCNC: 9.8 MG/DL (ref 8.6–10.3)
CARDIAC TROPONIN I PNL SERPL HS: 12 NG/L (ref 0–20)
CARDIAC TROPONIN I PNL SERPL HS: 12 NG/L (ref 0–20)
CHLORIDE SERPL-SCNC: 105 MMOL/L (ref 98–107)
CO2 SERPL-SCNC: 24 MMOL/L (ref 21–32)
CREAT SERPL-MCNC: 1.02 MG/DL (ref 0.5–1.3)
EGFRCR SERPLBLD CKD-EPI 2021: 74 ML/MIN/1.73M*2
EOSINOPHIL # BLD AUTO: 0.67 X10*3/UL (ref 0–0.4)
EOSINOPHIL NFR BLD AUTO: 7.1 %
ERYTHROCYTE [DISTWIDTH] IN BLOOD BY AUTOMATED COUNT: 13.2 % (ref 11.5–14.5)
GLUCOSE SERPL-MCNC: 110 MG/DL (ref 74–99)
HCT VFR BLD AUTO: 42.8 % (ref 41–52)
HGB BLD-MCNC: 14.3 G/DL (ref 13.5–17.5)
IMM GRANULOCYTES # BLD AUTO: 0.03 X10*3/UL (ref 0–0.5)
IMM GRANULOCYTES NFR BLD AUTO: 0.3 % (ref 0–0.9)
LYMPHOCYTES # BLD AUTO: 3.57 X10*3/UL (ref 0.8–3)
LYMPHOCYTES NFR BLD AUTO: 37.6 %
MCH RBC QN AUTO: 32.2 PG (ref 26–34)
MCHC RBC AUTO-ENTMCNC: 33.4 G/DL (ref 32–36)
MCV RBC AUTO: 96 FL (ref 80–100)
MONOCYTES # BLD AUTO: 0.83 X10*3/UL (ref 0.05–0.8)
MONOCYTES NFR BLD AUTO: 8.7 %
NEUTROPHILS # BLD AUTO: 4.35 X10*3/UL (ref 1.6–5.5)
NEUTROPHILS NFR BLD AUTO: 45.9 %
NRBC BLD-RTO: 0 /100 WBCS (ref 0–0)
P AXIS: 36 DEGREES
P OFFSET: 163 MS
P ONSET: 104 MS
PLATELET # BLD AUTO: 218 X10*3/UL (ref 150–450)
POTASSIUM SERPL-SCNC: 3.9 MMOL/L (ref 3.5–5.3)
PR INTERVAL: 214 MS
Q ONSET: 211 MS
QRS COUNT: 12 BEATS
QRS DURATION: 100 MS
QT INTERVAL: 398 MS
QTC CALCULATION(BAZETT): 441 MS
QTC FREDERICIA: 427 MS
R AXIS: -14 DEGREES
RBC # BLD AUTO: 4.44 X10*6/UL (ref 4.5–5.9)
SODIUM SERPL-SCNC: 139 MMOL/L (ref 136–145)
T AXIS: 66 DEGREES
T OFFSET: 410 MS
VENTRICULAR RATE: 74 BPM
WBC # BLD AUTO: 9.5 X10*3/UL (ref 4.4–11.3)

## 2025-07-22 PROCEDURE — 93005 ELECTROCARDIOGRAM TRACING: CPT

## 2025-07-22 PROCEDURE — 80048 BASIC METABOLIC PNL TOTAL CA: CPT | Performed by: EMERGENCY MEDICINE

## 2025-07-22 PROCEDURE — 36415 COLL VENOUS BLD VENIPUNCTURE: CPT | Performed by: EMERGENCY MEDICINE

## 2025-07-22 PROCEDURE — 99213 OFFICE O/P EST LOW 20 MIN: CPT | Performed by: UROLOGY

## 2025-07-22 PROCEDURE — 99284 EMERGENCY DEPT VISIT MOD MDM: CPT | Performed by: EMERGENCY MEDICINE

## 2025-07-22 PROCEDURE — 84484 ASSAY OF TROPONIN QUANT: CPT | Performed by: EMERGENCY MEDICINE

## 2025-07-22 PROCEDURE — G2211 COMPLEX E/M VISIT ADD ON: HCPCS | Performed by: UROLOGY

## 2025-07-22 PROCEDURE — 85025 COMPLETE CBC W/AUTO DIFF WBC: CPT | Performed by: EMERGENCY MEDICINE

## 2025-07-22 ASSESSMENT — PAIN DESCRIPTION - PAIN TYPE: TYPE: ACUTE PAIN

## 2025-07-22 ASSESSMENT — HEART SCORE
HISTORY: SLIGHTLY SUSPICIOUS
ECG: NORMAL
TROPONIN: LESS THAN OR EQUAL TO NORMAL LIMIT
AGE: 65+
RISK FACTORS: 1-2 RISK FACTORS
HEART SCORE: 3

## 2025-07-22 ASSESSMENT — PAIN DESCRIPTION - ORIENTATION: ORIENTATION: LEFT

## 2025-07-22 ASSESSMENT — PAIN - FUNCTIONAL ASSESSMENT: PAIN_FUNCTIONAL_ASSESSMENT: 0-10

## 2025-07-22 ASSESSMENT — PAIN DESCRIPTION - LOCATION: LOCATION: ARM

## 2025-07-22 ASSESSMENT — PAIN SCALES - GENERAL: PAINLEVEL_OUTOF10: 2

## 2025-07-22 NOTE — ED PROVIDER NOTES
HPI   Chief Complaint   Patient presents with    Arm Injury     Pt c/o left tricep pain that started at 0200. Pain is now at 2/10. Pt denying chest pain.        HPI  79-year-old male presents with complaint of pain in his left tricep.  Patient woke up about 30 minutes prior to arrival with pain in the left tricep called EMS who transported him to hospital.  His pain has resolved with no intervention.  He was concerned he might be having a heart attack.  No chest pain or shortness of breath or dizziness or lightheadedness or nausea or vomiting.  Nothing seem to make the pain better or worse.  He did not take any medicine for the pain.  No other complaints      Patient History   Medical History[1]  Surgical History[2]  Family History[3]  Social History[4]    Physical Exam   ED Triage Vitals [07/22/25 0225]   Temperature Heart Rate Respirations BP   36.6 °C (97.9 °F) 78 16 (!) 179/96      Pulse Ox Temp Source Heart Rate Source Patient Position   94 % Temporal Monitor --      BP Location FiO2 (%)     -- --       Physical Exam  General:  Awake, alert, no acute distress.  Head: Normocephalic, Atraumatic  Neck: Supple, trachea midline, no stridor  Skin: Warm and dry, no rashes   Lungs: Clear to auscultation bilaterally no acute respiratory distress, speaking in full sentences without difficulty  CV: Regular Rate Rhythm with no obvious murmurs gallops rubs noted, no jugular venous distention, no pedal edema   Abdomen: Soft, nontender, nondistended, positive bowel sounds, no peritoneal signs  Neuro:  No gross focal neurologic deficits, NIH is 0  Musculoskeletal:  Full range of motion in all 4 extremities  Psychiatric:  Alert oriented x 3, Good insight into condition.    ED Course & MDM   Diagnoses as of 07/22/25 0413   Left upper arm pain                 No data recorded     Brianna Coma Scale Score: 15 (07/22/25 0228 : Jasmina Madera RN)                           Medical Decision Making  My EKG interpretation at 0 238:  Sinus  rhythm 74 bpm normal axis IN interval 214 QTc 441 no ectopy or acute ischemic changes noted    Patient never had return of this pain.  His workup is negative.  His heart score is 3.  Discussed the finding with the patient at bedside.  This point have low suspicion that this isolated left tricep pain is cardiac related.  Supportive care at home.  Follow-up with his doctor.  Return if condition worsens.  Stable for discharge.    Procedure  Procedures         [1]   Past Medical History:  Diagnosis Date    Atherosclerosis     Bladder stones 06/11/2024    BPH (benign prostatic hyperplasia)     Cervical spinal stenosis 10/10/2023    GERD (gastroesophageal reflux disease) 06/20/2023    Gout 06/20/2023    HLD (hyperlipidemia)     HTN (hypertension)     Lumbar stenosis 06/20/2023    Personal history of other endocrine, nutritional and metabolic disease 01/28/2016    History of hypoglycemia    PVD (peripheral vascular disease) 06/20/2023    Sleep apnea     Supraventricular tachycardia 06/20/2023    Vasovagal syncope    [2]   Past Surgical History:  Procedure Laterality Date    CARDIAC CATHETERIZATION      x 2    OTHER SURGICAL HISTORY  07/29/2019    Neck surgery    PROSTATE SURGERY      VASECTOMY  01/28/2016    Surgery Vas Deferens Vasectomy   [3]   Family History  Problem Relation Name Age of Onset    Breast cancer Mother      Accidental death Father      No Known Problems Sister      Hypertension Brother     [4]   Social History  Tobacco Use    Smoking status: Never    Smokeless tobacco: Never   Vaping Use    Vaping status: Never Used   Substance Use Topics    Alcohol use: Yes     Comment: RARELY    Drug use: Never        Barak Galarza DO  07/22/25 0414

## 2025-07-22 NOTE — DISCHARGE INSTRUCTIONS
Thank you for allowing us to care for you today.  You may receive a survey regarding your visit today.    If you were satisfied with the care and service provided we would be very grateful if you would give us a high rating.    Your feedback is very important to us.    Dr. Galarza        As of today's visit, based on reasonable likelihood, that it is safe for you to be discharged back to your residence to follow-up as an outpatient for ongoing management of your medical problem. You should follow-up with any referrals / primary provider as soon as possible. The contacts (number, addresses) are listed below.         Important:  Even though we think it is safe for you to go home, there is always a small chance that we are missing something that could require hospitalization.  Therefore it is very important that if you get worse or develops any new symptoms that you return here as soon as possible to be re-evaluated.  This includes return of symptoms that have resolved such as fainting, chest pain, or symptoms that could be warning signs for stroke important:  Even though we think it is safe for you to go home, there is always a small chance that we are missing something that could require hospitalization.  Therefore it is very important that if you get worse or develops any new symptoms that you return here as soon as possible to be re-evaluated.  This includes return of symptoms that have resolved such as fainting, chest pain, or symptoms that could be warning signs for stroke         Make sure your pharmacy and primary doctor is aware of any new medications prescribed today.          It is your responsibility to contact as soon as possible, and follow through with, any referrals you were given today. We do recommend you inform them you are a [facility] follow-up patient, as often they can better accommodate your need to be seen, provided their schedules allow. We will, and have, made every effort to ensure you have  access to adequate follow-up specialists available.          All problems may not be able to be fixed in one ER visit. This is why timely ongoing care is important, and this is a responsibility you share in. Further, you are free to follow up with any provider you choose, and this is not limited to our suggestion.          If cultures were obtained today, you will be contacted should anything result that would require further treatment. Please contact the ED at the number provided with questions.          Having trouble affording medications? Try [email or web address]! (This is not a hospital endorsed website, merely a recommendation based on my own personal experiences with [facility])

## 2025-07-22 NOTE — PROGRESS NOTES
Subjective     This visit was completed via telemedicine. All issues as below were discussed and addressed but no physical exam was performed unless allowed by visual confirmation. If it was felt that the patient should be evaluated in clinic, then they were directed there. Patient verbally consented to visit.    Cl Feliz is a 79 y.o. male with history of BPH and bladder stones s/p HoLEP with concurrent cystolitholapaxy on 07/22/24, [place] 6 prostate cancer, nephrolithiasis, and renal cysts. Patient presents today for a follow up visit to review PSA. Patient has no new complaints. Denies any recent gross hematuria, fevers, chills, urinary retention, intractable flank or abdominal pain, nausea or vomiting.              Past Medical History:   Diagnosis Date    Atherosclerosis     Bladder stones 06/11/2024    BPH (benign prostatic hyperplasia)     Cervical spinal stenosis 10/10/2023    GERD (gastroesophageal reflux disease) 06/20/2023    Gout 06/20/2023    HLD (hyperlipidemia)     HTN (hypertension)     Lumbar stenosis 06/20/2023    Personal history of other endocrine, nutritional and metabolic disease 01/28/2016    History of hypoglycemia    PVD (peripheral vascular disease) 06/20/2023    Sleep apnea     Supraventricular tachycardia 06/20/2023    Vasovagal syncope      Past Surgical History:   Procedure Laterality Date    CARDIAC CATHETERIZATION      x 2    OTHER SURGICAL HISTORY  07/29/2019    Neck surgery    PROSTATE SURGERY      VASECTOMY  01/28/2016    Surgery Vas Deferens Vasectomy     Family History   Problem Relation Name Age of Onset    Breast cancer Mother      Accidental death Father      No Known Problems Sister      Hypertension Brother       Current Outpatient Medications   Medication Sig Dispense Refill    allopurinol (Zyloprim) 300 mg tablet TAKE ONE TABLET BY MOUTH once EVERY DAY 90 tablet 3    aspirin 81 mg EC tablet Take 1 tablet (81 mg) by mouth once daily.      atorvastatin (Lipitor) 40 mg  tablet take 1 tablet by mouth once daily 90 tablet 1    calcium polycarbophil (FIBERCON ORAL) Take 2 tablets by mouth once daily at bedtime.      cephalexin (Keflex) 500 mg capsule Take 1 capsule (500 mg) by mouth 2 times a day for 10 days. 20 capsule 0    cholecalciferol (Vitamin D-3) 25 MCG (1000 UT) capsule Take 2 capsules (2,000 Units) by mouth once daily.      coenzyme Q-10 100 mg capsule Take by mouth.      cyclobenzaprine (Flexeril) 10 mg tablet Take 1 tablet (10 mg) by mouth as needed at bedtime for muscle spasms. 30 tablet 0    fluoride, sodium, (Prevident 5000 Booster) 1.1 % dental paste BRUSH TWICE A DAY. NO EATING / RINSING / DRINKING FOR 30 MINUTES AFTER      lisinopril 20 mg tablet TAKE ONE TABLET BY MOUTH EVERY DAY 90 tablet 1    meloxicam (Mobic) 15 mg tablet TAKE ONE TABLET BY MOUTH DAILY AS NEEDED 90 tablet 0    multivit-min/folic acid/vit K1 (MULTI FOR HIM, NO IRON, ORAL) Take by mouth.      sildenafil (Viagra) 50 mg tablet TAKE 1 TABLET BY MOUTH EVERY DAY AS NEEDED APPROXIMATELY 1 HOUR BEFORE SEXUAL ACTIVITY      triamcinolone (Kenalog) 0.1 % ointment       Xdemvy 0.25 % drops INSTILL 1 DROP IN BOTH EYES TWICE DAILY FOR 6 WEEKS       No current facility-administered medications for this visit.     Allergies   Allergen Reactions    Adhesive Tape-Silicones Rash     Social History     Socioeconomic History    Marital status:      Spouse name: Not on file    Number of children: 2    Years of education: Not on file    Highest education level: Not on file   Occupational History    Occupation: Retired   Tobacco Use    Smoking status: Never    Smokeless tobacco: Never   Vaping Use    Vaping status: Never Used   Substance and Sexual Activity    Alcohol use: Yes     Comment: RARELY    Drug use: Never    Sexual activity: Defer   Other Topics Concern    Not on file   Social History Narrative    Not on file     Social Drivers of Health     Financial Resource Strain: Not on file   Food Insecurity: Not  on file   Transportation Needs: Not on file   Physical Activity: Not on file   Stress: Not on file   Social Connections: Not on file   Intimate Partner Violence: Not on file   Housing Stability: Not on file       Review of Systems  Pertinent items are noted in HPI.    Objective       Lab Review  Lab Results   Component Value Date    WBC 9.5 07/22/2025    RBC 4.44 (L) 07/22/2025    HGB 14.3 07/22/2025    HCT 42.8 07/22/2025     07/22/2025      Lab Results   Component Value Date    BUN 20 07/22/2025    CREATININE 1.02 07/22/2025     Lab Results   Component Value Date    PSA 0.85 07/16/2025    PSA 0.56 01/14/2025       Assessment/Plan   There are no diagnoses linked to this encounter.      BPH and bladder stones s/p HoLEP with concurrent cystolitholapaxy on 07/22/24  [place] 6 prostate cancer  PI-RADS 4/5 lesion     I personally reviewed the patients labs; PSA was 0.85 on 7/16/2025, increased from 0.56 on 1/14/2025.     Patient is due for prostate MRI in October.     We will follow up virtually in October to review prostate MRI.      All questions were answered to the patient's satisfaction. Patient agrees with the plan and wishes to proceed. Follow-up will be scheduled appropriately.     E&M visit today is associated with current or anticipated ongoing medical care services related to a patient's single, serious condition or a complex condition.      I, Zulma Heredia am scribing for, and in the presence of Dr Balbuena.      I, Dr Balbuena, personally performed the services described in the documentation as scribed by Zulma Heredia in my presence, and confirm it is both accurate and complete.

## 2025-07-23 ENCOUNTER — OFFICE VISIT (OUTPATIENT)
Dept: PRIMARY CARE | Facility: CLINIC | Age: 80
End: 2025-07-23
Payer: MEDICARE

## 2025-07-23 VITALS
HEART RATE: 88 BPM | OXYGEN SATURATION: 94 % | BODY MASS INDEX: 31.08 KG/M2 | HEIGHT: 71 IN | SYSTOLIC BLOOD PRESSURE: 130 MMHG | WEIGHT: 222 LBS | DIASTOLIC BLOOD PRESSURE: 80 MMHG

## 2025-07-23 DIAGNOSIS — M48.02 CERVICAL SPINAL STENOSIS: ICD-10-CM

## 2025-07-23 DIAGNOSIS — M25.511 CHRONIC RIGHT SHOULDER PAIN: ICD-10-CM

## 2025-07-23 DIAGNOSIS — M54.31 SCIATICA OF RIGHT SIDE: ICD-10-CM

## 2025-07-23 DIAGNOSIS — L08.9 TOE INFECTION: Primary | ICD-10-CM

## 2025-07-23 DIAGNOSIS — G89.29 CHRONIC RIGHT SHOULDER PAIN: ICD-10-CM

## 2025-07-23 PROCEDURE — 1160F RVW MEDS BY RX/DR IN RCRD: CPT | Performed by: STUDENT IN AN ORGANIZED HEALTH CARE EDUCATION/TRAINING PROGRAM

## 2025-07-23 PROCEDURE — 1036F TOBACCO NON-USER: CPT | Performed by: STUDENT IN AN ORGANIZED HEALTH CARE EDUCATION/TRAINING PROGRAM

## 2025-07-23 PROCEDURE — 3075F SYST BP GE 130 - 139MM HG: CPT | Performed by: STUDENT IN AN ORGANIZED HEALTH CARE EDUCATION/TRAINING PROGRAM

## 2025-07-23 PROCEDURE — 1125F AMNT PAIN NOTED PAIN PRSNT: CPT | Performed by: STUDENT IN AN ORGANIZED HEALTH CARE EDUCATION/TRAINING PROGRAM

## 2025-07-23 PROCEDURE — 99214 OFFICE O/P EST MOD 30 MIN: CPT | Performed by: STUDENT IN AN ORGANIZED HEALTH CARE EDUCATION/TRAINING PROGRAM

## 2025-07-23 PROCEDURE — 3079F DIAST BP 80-89 MM HG: CPT | Performed by: STUDENT IN AN ORGANIZED HEALTH CARE EDUCATION/TRAINING PROGRAM

## 2025-07-23 PROCEDURE — 1159F MED LIST DOCD IN RCRD: CPT | Performed by: STUDENT IN AN ORGANIZED HEALTH CARE EDUCATION/TRAINING PROGRAM

## 2025-07-23 RX ORDER — CALCIUM CARBONATE 160(400)MG
1 TABLET,CHEWABLE ORAL ONCE
Qty: 1 EACH | Refills: 0 | Status: SHIPPED | OUTPATIENT
Start: 2025-07-23 | End: 2025-07-23

## 2025-07-23 RX ORDER — CEPHALEXIN 500 MG/1
500 CAPSULE ORAL 2 TIMES DAILY
Qty: 8 CAPSULE | Refills: 0 | Status: SHIPPED | OUTPATIENT
Start: 2025-07-23 | End: 2025-07-27

## 2025-07-23 RX ORDER — METHYLPREDNISOLONE 4 MG/1
TABLET ORAL
Qty: 21 TABLET | Refills: 0 | Status: SHIPPED | OUTPATIENT
Start: 2025-07-23 | End: 2025-07-29

## 2025-07-23 ASSESSMENT — PATIENT HEALTH QUESTIONNAIRE - PHQ9
SUM OF ALL RESPONSES TO PHQ9 QUESTIONS 1 AND 2: 0
1. LITTLE INTEREST OR PLEASURE IN DOING THINGS: NOT AT ALL
2. FEELING DOWN, DEPRESSED OR HOPELESS: NOT AT ALL

## 2025-07-23 ASSESSMENT — PAIN SCALES - GENERAL: PAINLEVEL_OUTOF10: 2

## 2025-07-23 NOTE — PROGRESS NOTES
"  Subjective   Patient ID:   Cl Feliz is a  79 y.o. male who presents for Shoulder Pain (Patient is present in office for a right shoulder pain radiates to upper arm x 3-4 weeks - no injury - tried Tylenol and icyhot with some relief. Laying in bed Monday night and had a bad pain under left upper arm - went to ER follow up ) and ER Follow-up (7/22/25 - [facility]- left upper arm pain- happened last night as well when laying in bed got worse when he went to get up out of bed then when he was able to get up the pain subsided. ).     HPI  4 weeks ago right shoulder pain     Left arm: ED visit 7/22/25 [facility] visit for left shoulder pain   Laying in bed. Started having a twinge that worsened over time. Woke his wife and called EMS. He presented to the ED for concerns of cardiac pathology. Trop neg x 2, cbc, cmp was neg, EKG was unchanged. Blood pressure was 179/96 mmHg   Last night symptoms returned, resolves upon moving around.   Denies any injury, no heavy lifting, driving with the left arm now due to right shoulder pain     4 weeks of right shoulder pain   Worse with adduction of the shoulder       Current Medications[1]    Visit Vitals  /80   Pulse 88   Ht 1.803 m (5' 11\")   Wt 101 kg (222 lb)   SpO2 94%   BMI 30.96 kg/m²   Smoking Status Never   BSA 2.25 m²       Objective   Physical Exam  Vitals reviewed.   Constitutional:       Appearance: Normal appearance.     Cardiovascular:      Rate and Rhythm: Normal rate and regular rhythm.      Heart sounds: No murmur heard.  Pulmonary:      Effort: Pulmonary effort is normal. No respiratory distress.      Breath sounds: Normal breath sounds. No wheezing.     Musculoskeletal:      Cervical back: Neck supple.      Left lower leg: No edema.     Neurological:      Mental Status: He is alert.           Assessment/Plan   Diagnoses and all orders for this visit:  Toe infection  -     Referral to Podiatry; Future  -     cephalexin (Keflex) 500 mg capsule; Take 1 capsule " (500 mg) by mouth 2 times a day for 4 days.  Chronic right shoulder pain  -     XR shoulder right 2+ views; Future  Cervical spinal stenosis  -     walker (Ultra-Light Rollator) misc; 1 each 1 time for 1 dose.  Sciatica of right side  -     methylPREDNISolone (Medrol Dospak) 4 mg tablets; Take as directed on package.  Toe infection   Improving will extend course to 14 days keflex  -refer to podiatry     Arm Pain   Likely muscular strain   Stretching and tylenol recommended   Xray shoulder on the right   Medrol dosepak        An C Brock, DO 03/12/25 2:02 PM          [1]   Current Outpatient Medications:     allopurinol (Zyloprim) 300 mg tablet, TAKE ONE TABLET BY MOUTH once EVERY DAY, Disp: 90 tablet, Rfl: 3    aspirin 81 mg EC tablet, Take 1 tablet (81 mg) by mouth once daily., Disp: , Rfl:     atorvastatin (Lipitor) 40 mg tablet, take 1 tablet by mouth once daily, Disp: 90 tablet, Rfl: 1    calcium polycarbophil (FIBERCON ORAL), Take 2 tablets by mouth once daily at bedtime., Disp: , Rfl:     cephalexin (Keflex) 500 mg capsule, Take 1 capsule (500 mg) by mouth 2 times a day for 10 days., Disp: 20 capsule, Rfl: 0    cholecalciferol (Vitamin D-3) 25 MCG (1000 UT) capsule, Take 2 capsules (2,000 Units) by mouth once daily., Disp: , Rfl:     coenzyme Q-10 100 mg capsule, Take by mouth., Disp: , Rfl:     cyclobenzaprine (Flexeril) 10 mg tablet, Take 1 tablet (10 mg) by mouth as needed at bedtime for muscle spasms., Disp: 30 tablet, Rfl: 0    fluoride, sodium, (Prevident 5000 Booster) 1.1 % dental paste, BRUSH TWICE A DAY. NO EATING / RINSING / DRINKING FOR 30 MINUTES AFTER, Disp: , Rfl:     lisinopril 20 mg tablet, TAKE ONE TABLET BY MOUTH EVERY DAY, Disp: 90 tablet, Rfl: 1    meloxicam (Mobic) 15 mg tablet, TAKE ONE TABLET BY MOUTH DAILY AS NEEDED, Disp: 90 tablet, Rfl: 0    multivit-min/folic acid/vit K1 (MULTI FOR HIM, NO IRON, ORAL), Take by mouth., Disp: , Rfl:     sildenafil (Viagra) 50 mg tablet, TAKE 1 TABLET  BY MOUTH EVERY DAY AS NEEDED APPROXIMATELY 1 HOUR BEFORE SEXUAL ACTIVITY, Disp: , Rfl:     triamcinolone (Kenalog) 0.1 % ointment, , Disp: , Rfl:     Xdemvy 0.25 % drops, INSTILL 1 DROP IN BOTH EYES TWICE DAILY FOR 6 WEEKS, Disp: , Rfl:

## 2025-07-23 NOTE — PATIENT INSTRUCTIONS
Please increase your fluids along with supplementation of magnesium 250 mg- 500 mg nightly.   Let's start nightly stretching of the shoulders.   May continue tylenol 1000 mg TID as needed for pain.     I extended your antibiotic for 4 more days for a total of 14 day course for your toe.     Also stop by Nadia pérez to help schedule with podiatry.     We will need the DME  for your insurance.     For your back, you can have a medrol dosepak (hold NSAIDs including meloxicam). Do not start until after you finish your toe antibiotics.

## 2025-07-24 ENCOUNTER — HOSPITAL ENCOUNTER (OUTPATIENT)
Dept: RADIOLOGY | Facility: HOSPITAL | Age: 80
Discharge: HOME | End: 2025-07-24
Payer: MEDICARE

## 2025-07-24 DIAGNOSIS — M25.511 CHRONIC RIGHT SHOULDER PAIN: ICD-10-CM

## 2025-07-24 DIAGNOSIS — G89.29 CHRONIC RIGHT SHOULDER PAIN: ICD-10-CM

## 2025-07-24 PROCEDURE — 73030 X-RAY EXAM OF SHOULDER: CPT | Mod: RT

## 2025-07-24 PROCEDURE — 73030 X-RAY EXAM OF SHOULDER: CPT | Mod: RIGHT SIDE | Performed by: RADIOLOGY

## 2025-08-17 ENCOUNTER — HOSPITAL ENCOUNTER (EMERGENCY)
Facility: HOSPITAL | Age: 80
Discharge: HOME | End: 2025-08-17
Attending: STUDENT IN AN ORGANIZED HEALTH CARE EDUCATION/TRAINING PROGRAM
Payer: MEDICARE

## 2025-08-17 ENCOUNTER — APPOINTMENT (OUTPATIENT)
Dept: CARDIOLOGY | Facility: HOSPITAL | Age: 80
End: 2025-08-17
Payer: MEDICARE

## 2025-08-17 ENCOUNTER — APPOINTMENT (OUTPATIENT)
Dept: RADIOLOGY | Facility: HOSPITAL | Age: 80
End: 2025-08-17
Payer: MEDICARE

## 2025-08-17 VITALS
SYSTOLIC BLOOD PRESSURE: 127 MMHG | HEART RATE: 81 BPM | OXYGEN SATURATION: 94 % | DIASTOLIC BLOOD PRESSURE: 82 MMHG | RESPIRATION RATE: 16 BRPM | HEIGHT: 71 IN | BODY MASS INDEX: 30.86 KG/M2 | WEIGHT: 220.46 LBS | TEMPERATURE: 97.3 F

## 2025-08-17 DIAGNOSIS — M25.512 ACUTE PAIN OF LEFT SHOULDER: Primary | ICD-10-CM

## 2025-08-17 LAB
ALBUMIN SERPL BCP-MCNC: 4.2 G/DL (ref 3.4–5)
ALP SERPL-CCNC: 48 U/L (ref 33–136)
ALT SERPL W P-5'-P-CCNC: 38 U/L (ref 10–52)
ANION GAP SERPL CALCULATED.3IONS-SCNC: 13 MMOL/L (ref 10–20)
AST SERPL W P-5'-P-CCNC: 22 U/L (ref 9–39)
BASOPHILS # BLD AUTO: 0.03 X10*3/UL (ref 0–0.1)
BASOPHILS NFR BLD AUTO: 0.3 %
BILIRUB SERPL-MCNC: 0.7 MG/DL (ref 0–1.2)
BUN SERPL-MCNC: 22 MG/DL (ref 6–23)
CALCIUM SERPL-MCNC: 9.8 MG/DL (ref 8.6–10.3)
CARDIAC TROPONIN I PNL SERPL HS: 12 NG/L (ref 0–20)
CARDIAC TROPONIN I PNL SERPL HS: 13 NG/L (ref 0–20)
CHLORIDE SERPL-SCNC: 106 MMOL/L (ref 98–107)
CO2 SERPL-SCNC: 24 MMOL/L (ref 21–32)
CREAT SERPL-MCNC: 0.98 MG/DL (ref 0.5–1.3)
EGFRCR SERPLBLD CKD-EPI 2021: 78 ML/MIN/1.73M*2
EOSINOPHIL # BLD AUTO: 0.42 X10*3/UL (ref 0–0.4)
EOSINOPHIL NFR BLD AUTO: 4.8 %
ERYTHROCYTE [DISTWIDTH] IN BLOOD BY AUTOMATED COUNT: 13.2 % (ref 11.5–14.5)
GLUCOSE SERPL-MCNC: 123 MG/DL (ref 74–99)
HCT VFR BLD AUTO: 42.5 % (ref 41–52)
HGB BLD-MCNC: 14.4 G/DL (ref 13.5–17.5)
IMM GRANULOCYTES # BLD AUTO: 0.02 X10*3/UL (ref 0–0.5)
IMM GRANULOCYTES NFR BLD AUTO: 0.2 % (ref 0–0.9)
LYMPHOCYTES # BLD AUTO: 2.21 X10*3/UL (ref 0.8–3)
LYMPHOCYTES NFR BLD AUTO: 25 %
MCH RBC QN AUTO: 32.4 PG (ref 26–34)
MCHC RBC AUTO-ENTMCNC: 33.9 G/DL (ref 32–36)
MCV RBC AUTO: 96 FL (ref 80–100)
MONOCYTES # BLD AUTO: 0.47 X10*3/UL (ref 0.05–0.8)
MONOCYTES NFR BLD AUTO: 5.3 %
NEUTROPHILS # BLD AUTO: 5.69 X10*3/UL (ref 1.6–5.5)
NEUTROPHILS NFR BLD AUTO: 64.4 %
NRBC BLD-RTO: 0 /100 WBCS (ref 0–0)
PLATELET # BLD AUTO: 211 X10*3/UL (ref 150–450)
POTASSIUM SERPL-SCNC: 3.9 MMOL/L (ref 3.5–5.3)
PROT SERPL-MCNC: 6.8 G/DL (ref 6.4–8.2)
RBC # BLD AUTO: 4.45 X10*6/UL (ref 4.5–5.9)
SODIUM SERPL-SCNC: 139 MMOL/L (ref 136–145)
WBC # BLD AUTO: 8.8 X10*3/UL (ref 4.4–11.3)

## 2025-08-17 PROCEDURE — 71275 CT ANGIOGRAPHY CHEST: CPT

## 2025-08-17 PROCEDURE — 2500000001 HC RX 250 WO HCPCS SELF ADMINISTERED DRUGS (ALT 637 FOR MEDICARE OP): Performed by: STUDENT IN AN ORGANIZED HEALTH CARE EDUCATION/TRAINING PROGRAM

## 2025-08-17 PROCEDURE — 36415 COLL VENOUS BLD VENIPUNCTURE: CPT | Performed by: STUDENT IN AN ORGANIZED HEALTH CARE EDUCATION/TRAINING PROGRAM

## 2025-08-17 PROCEDURE — 80053 COMPREHEN METABOLIC PANEL: CPT | Performed by: STUDENT IN AN ORGANIZED HEALTH CARE EDUCATION/TRAINING PROGRAM

## 2025-08-17 PROCEDURE — 71275 CT ANGIOGRAPHY CHEST: CPT | Performed by: RADIOLOGY

## 2025-08-17 PROCEDURE — 85025 COMPLETE CBC W/AUTO DIFF WBC: CPT | Performed by: STUDENT IN AN ORGANIZED HEALTH CARE EDUCATION/TRAINING PROGRAM

## 2025-08-17 PROCEDURE — 2550000001 HC RX 255 CONTRASTS: Performed by: STUDENT IN AN ORGANIZED HEALTH CARE EDUCATION/TRAINING PROGRAM

## 2025-08-17 PROCEDURE — 84484 ASSAY OF TROPONIN QUANT: CPT | Performed by: STUDENT IN AN ORGANIZED HEALTH CARE EDUCATION/TRAINING PROGRAM

## 2025-08-17 PROCEDURE — 99285 EMERGENCY DEPT VISIT HI MDM: CPT | Mod: 25 | Performed by: STUDENT IN AN ORGANIZED HEALTH CARE EDUCATION/TRAINING PROGRAM

## 2025-08-17 PROCEDURE — 93005 ELECTROCARDIOGRAM TRACING: CPT

## 2025-08-17 PROCEDURE — 74174 CTA ABD&PLVS W/CONTRAST: CPT | Performed by: RADIOLOGY

## 2025-08-17 RX ORDER — NAPROXEN 500 MG/1
500 TABLET ORAL
Qty: 30 TABLET | Refills: 0 | Status: SHIPPED | OUTPATIENT
Start: 2025-08-17 | End: 2025-09-01

## 2025-08-17 RX ORDER — ASPIRIN 325 MG
325 TABLET ORAL ONCE
Status: COMPLETED | OUTPATIENT
Start: 2025-08-17 | End: 2025-08-17

## 2025-08-17 RX ADMIN — IOHEXOL 75 ML: 350 INJECTION, SOLUTION INTRAVENOUS at 11:18

## 2025-08-17 RX ADMIN — ASPIRIN 325 MG ORAL TABLET 325 MG: 325 PILL ORAL at 10:07

## 2025-08-17 ASSESSMENT — PAIN SCALES - GENERAL: PAINLEVEL_OUTOF10: 3

## 2025-08-17 ASSESSMENT — PAIN - FUNCTIONAL ASSESSMENT: PAIN_FUNCTIONAL_ASSESSMENT: 0-10

## 2025-08-18 LAB
ATRIAL RATE: 96 BPM
P AXIS: 24 DEGREES
P OFFSET: 165 MS
P ONSET: 110 MS
PR INTERVAL: 204 MS
Q ONSET: 212 MS
QRS COUNT: 16 BEATS
QRS DURATION: 94 MS
QT INTERVAL: 350 MS
QTC CALCULATION(BAZETT): 442 MS
QTC FREDERICIA: 409 MS
R AXIS: -18 DEGREES
T AXIS: 67 DEGREES
T OFFSET: 387 MS
VENTRICULAR RATE: 96 BPM

## 2025-08-21 ENCOUNTER — HOSPITAL ENCOUNTER (OUTPATIENT)
Dept: RADIOLOGY | Facility: HOSPITAL | Age: 80
Discharge: HOME | End: 2025-08-21
Payer: MEDICARE

## 2025-08-21 ENCOUNTER — OFFICE VISIT (OUTPATIENT)
Dept: PRIMARY CARE | Facility: CLINIC | Age: 80
End: 2025-08-21
Payer: MEDICARE

## 2025-08-21 VITALS
BODY MASS INDEX: 30.94 KG/M2 | HEART RATE: 82 BPM | SYSTOLIC BLOOD PRESSURE: 119 MMHG | OXYGEN SATURATION: 94 % | HEIGHT: 71 IN | DIASTOLIC BLOOD PRESSURE: 70 MMHG | WEIGHT: 221 LBS

## 2025-08-21 DIAGNOSIS — K59.04 CHRONIC IDIOPATHIC CONSTIPATION: ICD-10-CM

## 2025-08-21 DIAGNOSIS — R93.89 ABNORMAL CT SCAN: Primary | ICD-10-CM

## 2025-08-21 DIAGNOSIS — M25.512 CHRONIC LEFT SHOULDER PAIN: ICD-10-CM

## 2025-08-21 DIAGNOSIS — I25.83 CORONARY ARTERY DISEASE DUE TO LIPID RICH PLAQUE: ICD-10-CM

## 2025-08-21 DIAGNOSIS — Q43.9 DUODENAL ANOMALY: ICD-10-CM

## 2025-08-21 DIAGNOSIS — M48.061 SPINAL STENOSIS OF LUMBAR REGION WITHOUT NEUROGENIC CLAUDICATION: ICD-10-CM

## 2025-08-21 DIAGNOSIS — I73.9 CLAUDICATION: ICD-10-CM

## 2025-08-21 DIAGNOSIS — G89.29 CHRONIC LEFT SHOULDER PAIN: ICD-10-CM

## 2025-08-21 DIAGNOSIS — M54.16 LUMBAR RADICULOPATHY: ICD-10-CM

## 2025-08-21 DIAGNOSIS — I25.10 CORONARY ARTERY DISEASE DUE TO LIPID RICH PLAQUE: ICD-10-CM

## 2025-08-21 PROCEDURE — G2211 COMPLEX E/M VISIT ADD ON: HCPCS | Performed by: STUDENT IN AN ORGANIZED HEALTH CARE EDUCATION/TRAINING PROGRAM

## 2025-08-21 PROCEDURE — 3078F DIAST BP <80 MM HG: CPT | Performed by: STUDENT IN AN ORGANIZED HEALTH CARE EDUCATION/TRAINING PROGRAM

## 2025-08-21 PROCEDURE — 3074F SYST BP LT 130 MM HG: CPT | Performed by: STUDENT IN AN ORGANIZED HEALTH CARE EDUCATION/TRAINING PROGRAM

## 2025-08-21 PROCEDURE — 1159F MED LIST DOCD IN RCRD: CPT | Performed by: STUDENT IN AN ORGANIZED HEALTH CARE EDUCATION/TRAINING PROGRAM

## 2025-08-21 PROCEDURE — 73030 X-RAY EXAM OF SHOULDER: CPT | Mod: LT

## 2025-08-21 PROCEDURE — 73030 X-RAY EXAM OF SHOULDER: CPT | Mod: LEFT SIDE | Performed by: RADIOLOGY

## 2025-08-21 PROCEDURE — 99214 OFFICE O/P EST MOD 30 MIN: CPT | Performed by: STUDENT IN AN ORGANIZED HEALTH CARE EDUCATION/TRAINING PROGRAM

## 2025-08-21 RX ORDER — CALCIUM CARBONATE 160(400)MG
1 TABLET,CHEWABLE ORAL ONCE
Qty: 1 EACH | Refills: 0 | Status: SHIPPED | OUTPATIENT
Start: 2025-08-21 | End: 2025-08-21

## 2025-08-21 ASSESSMENT — ENCOUNTER SYMPTOMS
OCCASIONAL FEELINGS OF UNSTEADINESS: 0
LOSS OF SENSATION IN FEET: 0
DEPRESSION: 0

## 2025-08-22 RX ORDER — KETOCONAZOLE 20 MG/G
CREAM TOPICAL
COMMUNITY
Start: 2025-08-05 | End: 2025-09-04

## 2025-08-27 DIAGNOSIS — K59.00 CONSTIPATION, UNSPECIFIED CONSTIPATION TYPE: Primary | ICD-10-CM

## 2025-08-27 RX ORDER — LACTULOSE 10 G/15ML
10 SOLUTION ORAL DAILY
Qty: 225 ML | Refills: 0 | Status: SHIPPED | OUTPATIENT
Start: 2025-08-27 | End: 2025-09-11

## 2025-08-28 ENCOUNTER — HOSPITAL ENCOUNTER (OUTPATIENT)
Dept: GASTROENTEROLOGY | Facility: HOSPITAL | Age: 80
Discharge: HOME | End: 2025-08-28
Payer: MEDICARE

## 2025-08-28 VITALS
TEMPERATURE: 96.8 F | OXYGEN SATURATION: 94 % | HEART RATE: 76 BPM | HEIGHT: 71 IN | BODY MASS INDEX: 30.25 KG/M2 | SYSTOLIC BLOOD PRESSURE: 144 MMHG | DIASTOLIC BLOOD PRESSURE: 87 MMHG | RESPIRATION RATE: 16 BRPM | WEIGHT: 216.05 LBS

## 2025-08-28 DIAGNOSIS — R93.89 ABNORMAL CT SCAN: Primary | ICD-10-CM

## 2025-08-28 DIAGNOSIS — Q43.9 DUODENAL ANOMALY: ICD-10-CM

## 2025-08-28 PROCEDURE — 43235 EGD DIAGNOSTIC BRUSH WASH: CPT | Performed by: INTERNAL MEDICINE

## 2025-08-28 PROCEDURE — 2500000004 HC RX 250 GENERAL PHARMACY W/ HCPCS (ALT 636 FOR OP/ED): Performed by: INTERNAL MEDICINE

## 2025-08-28 PROCEDURE — G0500 MOD SEDAT ENDO SERVICE >5YRS: HCPCS | Performed by: INTERNAL MEDICINE

## 2025-08-28 PROCEDURE — 7100000009 HC PHASE TWO TIME - INITIAL BASE CHARGE

## 2025-08-28 PROCEDURE — 7100000010 HC PHASE TWO TIME - EACH INCREMENTAL 1 MINUTE

## 2025-08-28 PROCEDURE — 3700000012 HC SEDATION LEVEL 5+ TIME - INITIAL 15 MINUTES 5/> YEARS

## 2025-08-28 PROCEDURE — 2500000005 HC RX 250 GENERAL PHARMACY W/O HCPCS: Performed by: INTERNAL MEDICINE

## 2025-08-28 RX ORDER — FENTANYL CITRATE 50 UG/ML
INJECTION, SOLUTION INTRAMUSCULAR; INTRAVENOUS AS NEEDED
Status: COMPLETED | OUTPATIENT
Start: 2025-08-28 | End: 2025-08-28

## 2025-08-28 RX ORDER — MIDAZOLAM HYDROCHLORIDE 2 MG/2ML
INJECTION, SOLUTION INTRAMUSCULAR; INTRAVENOUS AS NEEDED
Status: COMPLETED | OUTPATIENT
Start: 2025-08-28 | End: 2025-08-28

## 2025-08-28 RX ADMIN — MIDAZOLAM HYDROCHLORIDE 2 MG: 1 INJECTION, SOLUTION INTRAMUSCULAR; INTRAVENOUS at 10:01

## 2025-08-28 RX ADMIN — Medication 1 DOSE: at 09:59

## 2025-08-28 RX ADMIN — FENTANYL CITRATE 50 MCG: 50 INJECTION, SOLUTION INTRAMUSCULAR; INTRAVENOUS at 10:01

## 2025-08-28 ASSESSMENT — PAIN SCALES - GENERAL
PAINLEVEL_OUTOF10: 3
PAINLEVEL_OUTOF10: 0 - NO PAIN
PAINLEVEL_OUTOF10: 5 - MODERATE PAIN
PAINLEVEL_OUTOF10: 0 - NO PAIN
PAINLEVEL_OUTOF10: 0 - NO PAIN

## 2025-08-28 ASSESSMENT — PAIN - FUNCTIONAL ASSESSMENT: PAIN_FUNCTIONAL_ASSESSMENT: 0-10

## 2025-08-29 ASSESSMENT — PAIN SCALES - GENERAL: PAINLEVEL_OUTOF10: 0 - NO PAIN

## 2025-09-05 ENCOUNTER — HOSPITAL ENCOUNTER (OUTPATIENT)
Dept: VASCULAR MEDICINE | Facility: CLINIC | Age: 80
Discharge: HOME | End: 2025-09-05
Payer: MEDICARE

## 2025-09-05 DIAGNOSIS — I73.9 CLAUDICATION: ICD-10-CM

## 2025-09-05 DIAGNOSIS — I25.10 CORONARY ARTERY DISEASE DUE TO LIPID RICH PLAQUE: ICD-10-CM

## 2025-09-05 DIAGNOSIS — I25.83 CORONARY ARTERY DISEASE DUE TO LIPID RICH PLAQUE: ICD-10-CM

## 2025-09-05 PROCEDURE — 93924 LWR XTR VASC STDY BILAT: CPT | Performed by: INTERNAL MEDICINE

## 2025-09-05 PROCEDURE — 93924 LWR XTR VASC STDY BILAT: CPT

## 2025-10-22 ENCOUNTER — APPOINTMENT (OUTPATIENT)
Dept: UROLOGY | Facility: CLINIC | Age: 80
End: 2025-10-22
Payer: MEDICARE

## 2025-11-20 ENCOUNTER — APPOINTMENT (OUTPATIENT)
Dept: OTOLARYNGOLOGY | Facility: CLINIC | Age: 80
End: 2025-11-20
Payer: MEDICARE

## 2025-12-03 ENCOUNTER — APPOINTMENT (OUTPATIENT)
Dept: PRIMARY CARE | Facility: CLINIC | Age: 80
End: 2025-12-03
Payer: MEDICARE

## 2025-12-31 ENCOUNTER — APPOINTMENT (OUTPATIENT)
Dept: CARDIOLOGY | Facility: CLINIC | Age: 80
End: 2025-12-31
Payer: MEDICARE

## 2026-01-06 ENCOUNTER — APPOINTMENT (OUTPATIENT)
Dept: CARDIOLOGY | Facility: CLINIC | Age: 81
End: 2026-01-06
Payer: MEDICARE

## (undated) DEVICE — BLANKET, LOWER BODY, VHA PLUS, ADULT

## (undated) DEVICE — Device

## (undated) DEVICE — GLOVE, PROTEXIS PI CLASSIC, SZ-6.5, PF, LF

## (undated) DEVICE — GOWN, SURGICAL, SIRUS, NON REINFORCED, LARGE

## (undated) DEVICE — CATHETER, LASER URETERAL, 7.1FR, 40CM

## (undated) DEVICE — TUBING, MORCELLATOR PUMP, DISPOSABLE

## (undated) DEVICE — IRRIGATION SET, CYSTOSCOPY, F/CONSTANT/INTERMITTENT, 8 GTT/CC, 77 IN

## (undated) DEVICE — IRRIGATION SET, CYSTOSCOPY, TURP, Y, CONTINUOUS, 81 IN

## (undated) DEVICE — TUBING, ELLIK, FOR ELLIK/TOOMEY ADAPTERS

## (undated) DEVICE — BLADE, ROTATION MORCELLATOR, 4.8MM X 335MM, PIRHANA, DISPOSABLE

## (undated) DEVICE — SOLUTION, IRRIGATION, USP, SODIUM CHLORIDE 0.9%, 3000 ML, BAG

## (undated) DEVICE — SYRINGE, 20 CC, LUER LOCK

## (undated) DEVICE — UROVAC BLADDER EVACUATOR

## (undated) DEVICE — GUIDEWIRE, DUAL SENSOR, .035 X 150 STRAIGHT,  3CM

## (undated) DEVICE — SYRINGE, TOOMEY, IRRIGATION, 60ML, INDIVIDUAL WRAP, STERILE

## (undated) DEVICE — SPONGE, GAUZE, AVANT, STERILE, NONWOVEN, 4PLY, 4 X 4, STANDARD

## (undated) DEVICE — BAG, DRAINAGE, CONTINUOUS IRRIGATION, 4L

## (undated) DEVICE — 24FR, 3-WAY, 30CC, BARDEX LUBRICATH HEMATURIA LATEX FOLEY CATHETER, COUDE TIP